# Patient Record
Sex: FEMALE | Race: WHITE | NOT HISPANIC OR LATINO | Employment: UNEMPLOYED | URBAN - METROPOLITAN AREA
[De-identification: names, ages, dates, MRNs, and addresses within clinical notes are randomized per-mention and may not be internally consistent; named-entity substitution may affect disease eponyms.]

---
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
## 2020-02-24 ENCOUNTER — HEALTH MAINTENANCE LETTER (OUTPATIENT)
Age: 20
End: 2020-02-24

## 2020-08-04 ENCOUNTER — APPOINTMENT (OUTPATIENT)
Dept: LAB | Facility: CLINIC | Age: 20
End: 2020-08-04
Attending: NURSE PRACTITIONER
Payer: COMMERCIAL

## 2020-08-04 ENCOUNTER — HOSPITAL ENCOUNTER (OUTPATIENT)
Dept: ULTRASOUND IMAGING | Facility: CLINIC | Age: 20
End: 2020-08-04
Attending: NURSE PRACTITIONER
Payer: COMMERCIAL

## 2020-08-04 DIAGNOSIS — N20.0 KIDNEY STONES: Primary | ICD-10-CM

## 2020-08-04 DIAGNOSIS — R82.994 IDIOPATHIC HYPERCALCIURIA: ICD-10-CM

## 2020-08-04 DIAGNOSIS — N20.0 CALCULUS OF KIDNEY: ICD-10-CM

## 2020-08-04 DIAGNOSIS — E87.6 HYPOKALEMIA: ICD-10-CM

## 2020-08-04 LAB
ALBUMIN SERPL-MCNC: 3.8 G/DL (ref 3.4–5)
ALBUMIN UR-MCNC: NEGATIVE MG/DL
ANION GAP SERPL CALCULATED.3IONS-SCNC: 5 MMOL/L (ref 3–14)
APPEARANCE UR: ABNORMAL
BACTERIA #/AREA URNS HPF: ABNORMAL /HPF
BILIRUB UR QL STRIP: NEGATIVE
BUN SERPL-MCNC: 12 MG/DL (ref 7–30)
CALCIUM SERPL-MCNC: 9.8 MG/DL (ref 8.5–10.1)
CALCIUM UR-MCNC: 14 MG/DL
CALCIUM/CREAT UR: 0.12 G/G CR
CHLORIDE SERPL-SCNC: 100 MMOL/L (ref 94–109)
CO2 SERPL-SCNC: 31 MMOL/L (ref 20–32)
COLOR UR AUTO: YELLOW
CREAT SERPL-MCNC: 0.79 MG/DL (ref 0.52–1.04)
CREAT UR-MCNC: 113 MG/DL
CREAT UR-MCNC: 113 MG/DL
GFR SERPL CREATININE-BSD FRML MDRD: >90 ML/MIN/{1.73_M2}
GLUCOSE SERPL-MCNC: 78 MG/DL (ref 70–99)
GLUCOSE UR STRIP-MCNC: NEGATIVE MG/DL
HGB UR QL STRIP: NEGATIVE
KETONES UR STRIP-MCNC: NEGATIVE MG/DL
LEUKOCYTE ESTERASE UR QL STRIP: NEGATIVE
MUCOUS THREADS #/AREA URNS LPF: PRESENT /LPF
NITRATE UR QL: NEGATIVE
PH UR STRIP: 8 PH (ref 5–7)
PHOSPHATE SERPL-MCNC: 3.3 MG/DL (ref 2.5–4.5)
POTASSIUM SERPL-SCNC: 3.2 MMOL/L (ref 3.4–5.3)
PROT UR-MCNC: 0.15 G/L
PROT/CREAT 24H UR: 0.14 G/G CR (ref 0–0.2)
RBC #/AREA URNS AUTO: 1 /HPF (ref 0–2)
SODIUM SERPL-SCNC: 136 MMOL/L (ref 133–144)
SOURCE: ABNORMAL
SP GR UR STRIP: 1.01 (ref 1–1.03)
SQUAMOUS #/AREA URNS AUTO: 1 /HPF (ref 0–1)
UROBILINOGEN UR STRIP-MCNC: NORMAL MG/DL (ref 0–2)
WBC #/AREA URNS AUTO: <1 /HPF (ref 0–5)

## 2020-08-04 PROCEDURE — 36415 COLL VENOUS BLD VENIPUNCTURE: CPT | Performed by: PEDIATRICS

## 2020-08-04 PROCEDURE — 81001 URINALYSIS AUTO W/SCOPE: CPT | Performed by: PEDIATRICS

## 2020-08-04 PROCEDURE — 84156 ASSAY OF PROTEIN URINE: CPT | Performed by: PEDIATRICS

## 2020-08-04 PROCEDURE — 82340 ASSAY OF CALCIUM IN URINE: CPT | Performed by: PEDIATRICS

## 2020-08-04 PROCEDURE — 80069 RENAL FUNCTION PANEL: CPT | Performed by: PEDIATRICS

## 2020-08-04 PROCEDURE — 76770 US EXAM ABDO BACK WALL COMP: CPT

## 2020-08-17 ENCOUNTER — APPOINTMENT (OUTPATIENT)
Dept: URBAN - METROPOLITAN AREA CLINIC 259 | Age: 20
Setting detail: DERMATOLOGY
End: 2020-08-17

## 2020-08-17 DIAGNOSIS — L57.8 OTHER SKIN CHANGES DUE TO CHRONIC EXPOSURE TO NONIONIZING RADIATION: ICD-10-CM

## 2020-08-17 DIAGNOSIS — D22 MELANOCYTIC NEVI: ICD-10-CM

## 2020-08-17 PROBLEM — D22.5 MELANOCYTIC NEVI OF TRUNK: Status: ACTIVE | Noted: 2020-08-17

## 2020-08-17 PROCEDURE — 99214 OFFICE O/P EST MOD 30 MIN: CPT

## 2020-08-17 PROCEDURE — OTHER COUNSELING: OTHER

## 2020-08-17 ASSESSMENT — LOCATION ZONE DERM: LOCATION ZONE: TRUNK

## 2020-08-17 ASSESSMENT — LOCATION SIMPLE DESCRIPTION DERM
LOCATION SIMPLE: RIGHT UPPER BACK
LOCATION SIMPLE: LEFT UPPER BACK

## 2020-08-17 ASSESSMENT — LOCATION DETAILED DESCRIPTION DERM
LOCATION DETAILED: LEFT SUPERIOR UPPER BACK
LOCATION DETAILED: RIGHT MID-UPPER BACK

## 2020-08-25 ENCOUNTER — VIRTUAL VISIT (OUTPATIENT)
Dept: NEPHROLOGY | Facility: CLINIC | Age: 20
End: 2020-08-25
Attending: PEDIATRICS
Payer: COMMERCIAL

## 2020-08-25 DIAGNOSIS — E87.6 HYPOKALEMIA: ICD-10-CM

## 2020-08-25 DIAGNOSIS — R82.994 HYPERCALCIURIA: ICD-10-CM

## 2020-08-25 DIAGNOSIS — N20.0 CALCULUS OF KIDNEY: ICD-10-CM

## 2020-08-25 RX ORDER — HYDROCHLOROTHIAZIDE 25 MG/1
25 TABLET ORAL
Qty: 180 TABLET | Refills: 11 | Status: SHIPPED | OUTPATIENT
Start: 2020-08-25 | End: 2021-01-08

## 2020-08-25 RX ORDER — POTASSIUM CITRATE 15 MEQ/1
1 TABLET, EXTENDED RELEASE ORAL DAILY
Qty: 90 TABLET | Refills: 11 | Status: SHIPPED | OUTPATIENT
Start: 2020-08-25 | End: 2020-09-22

## 2020-08-25 NOTE — LETTER
8/25/2020      RE: Mariana Quintana  [address]  [facility] [address]       Mariana Quintana is a 20 year old female who is being evaluated via a billable telephone visit.          Return Visit for kidney stones, hypercalciuria    Chief Complaint:  Chief Complaint   Patient presents with     RECHECK       HPI:    I had the pleasure of seeing Mariana Quintana via telephone visit for follow-up of kidney stones, hypercalciuria. Mariana is a 20 year old female on her own today.  Mariana Quintana was last seen in the renal clinic on 8/20/19 by one of my partners, Nata Milligan NP. Since then she has been doing well with no hospitalizations and no surgeries. She has not passed any kidney stones. She has been drinking well and has a Yeti water bottle that she keeps with her. She is taking her hydrochlorothiazide and potassium and not missing doses. She has not had gross hematuria, flank pain. She does not have any side effects from her medications. No dizziness or lightheadedness.     Review of Systems:  No weight loss, dizziness, headaches, abdominal pain, gross hematuria    Allergies:  Mariana has No Known Allergies..    Active Medications:  Current Outpatient Medications   Medication Sig Dispense Refill     citalopram (CELEXA) 20 MG tablet Take 20 mg by mouth daily.       hydrochlorothiazide (HYDRODIURIL) 25 MG tablet Take 1 tablet (25 mg) by mouth 2 times daily 180 tablet 11     potassium citrate 15 MEQ (1620 MG) TBCR Take 1 tablet by mouth daily 90 tablet 11        Immunizations:    There is no immunization history on file for this patient.     PMHx:  Past Medical History:   Diagnosis Date     Idiopathic hypercalciuria     Started therapy with HCTZ in July 2009     Kidney stone 12/2017    Passed stone     Kidney stones Jan 2009    passed left sided stone on 1/28/13         PSHx:    Past Surgical History:   Procedure Laterality Date     LAPAROSCOPIC APPENDECTOMY  12/2017     Patella femoral ligament repair   12/27/2016     PE TUBES  Age 1    chronic otitis media       FHx:  Family History   Problem Relation Age of Onset     Genitourinary Problems Maternal Grandfather         Kidney stones developed in his teens     Kidney Disease Maternal Grandmother         Hypertensive kidney disease       SHx:  Social History     Tobacco Use     Smoking status: Never Smoker   Substance Use Topics     Alcohol use: Not on file     Drug use: Not on file     Social History     Social History Narrative    Mariana graduated from high school and is planning to attend [employer] in the Fall to study sociology, anthropology, and art. She is working as a [email or web address] this Summer.        Physical Exam:    None, video visit    Labs and Imaging:  Results for MARIANA BOWLING (MRN [id number]) as of 8/25/2020 15:37   Ref. Range 8/4/2020 13:40 8/4/2020 13:46   Sodium Latest Ref Range: 133 - 144 mmol/L  136   Potassium Latest Ref Range: 3.4 - 5.3 mmol/L  3.2 (L)   Chloride Latest Ref Range: 94 - 109 mmol/L  100   Carbon Dioxide Latest Ref Range: 20 - 32 mmol/L  31   Urea Nitrogen Latest Ref Range: 7 - 30 mg/dL  12   Creatinine Latest Ref Range: 0.52 - 1.04 mg/dL  0.79   GFR Estimate Latest Ref Range: >60 mL/min/1.73_m2  >90   GFR Estimate If Black Latest Ref Range: >60 mL/min/1.73_m2  >90   Calcium Latest Ref Range: 8.5 - 10.1 mg/dL  9.8   Anion Gap Latest Ref Range: 3 - 14 mmol/L  5   Phosphorus Latest Ref Range: 2.5 - 4.5 mg/dL  3.3   Albumin Latest Ref Range: 3.4 - 5.0 g/dL  3.8   Calcium Urine g/g Cr Latest Units: g/g Cr 0.12    Calcium Urine mg/dL Latest Units: mg/dL 14.0    Creatinine Urine Random Latest Units: mg/dL 113    Creatinine Urine Latest Units: mg/dL 113    Protein Random Urine Latest Units: g/L 0.15    Protein Total Urine g/gr Creatinine Latest Ref Range: 0 - 0.2 g/g Cr 0.14    Glucose Latest Ref Range: 70 - 99 mg/dL  78   Color Urine Unknown Yellow    Appearance Urine Unknown Cloudy    Glucose Urine Latest Ref Range: NEG^Negative mg/dL  Negative    Bilirubin Urine Latest Ref Range: NEG^Negative  Negative    Ketones Urine Latest Ref Range: NEG^Negative mg/dL Negative    Specific Gravity Urine Latest Ref Range: 1.003 - 1.035  1.015    pH Urine Latest Ref Range: 5.0 - 7.0 pH 8.0 (H)    Protein Albumin Urine Latest Ref Range: NEG^Negative mg/dL Negative    Urobilinogen mg/dL Latest Ref Range: 0.0 - 2.0 mg/dL Normal    Nitrite Urine Latest Ref Range: NEG^Negative  Negative    Blood Urine Latest Ref Range: NEG^Negative  Negative    Leukocyte Esterase Urine Latest Ref Range: NEG^Negative  Negative    Source Unknown Midstream Urine    WBC Urine Latest Ref Range: 0 - 5 /HPF <1    RBC Urine Latest Ref Range: 0 - 2 /HPF 1    Bacteria Urine Latest Ref Range: NEG^Negative /HPF Few (A)    Squamous Epithelial /HPF Urine Latest Ref Range: 0 - 1 /HPF 1    Mucous Urine Latest Ref Range: NEG^Negative /LPF Present (A)    EXAM: US RENAL COMPLETE.     HISTORY: Calculus of kidney.     COMPARISON: 8/20/2019     FINDINGS: The right kidney measures 11.8 cm, previously 11.9 cm while  the left kidney measures 12.1 cm, previously 12 cm. There is no  hydronephrosis.  There is no congenital malformation, focal scar, or  mass lesion. There are shadowing echogenic foci in both kidneys. 2  right-sided echogenic foci are present the largest measuring 5 mm. 2  left-sided echogenic foci are present the largest measuring 6.5 mm.  All are located in the interpolar regions of the kidneys.     The bladder is partly filled and unremarkable in appearance.                                                                      IMPRESSION: Multiple nonobstructing renal calculi, the largest  measures 6.5 mm (on the left), slightly increased in size from prior.     I personally reviewed results of laboratory evaluation, imaging studies and past medical records that were available during this outpatient visit.      Assessment and Plan:    Mariana is an 20 year old young woman with a history of kidney  stones due to hypercalciuria who was stone free from 9248-2281 while on HCTZ. This was stopped in May 2014 and a follow up ultrasound was negative for stones in Oct. 2014. Unfortunately, she redeveloped stones in August 2015, 24 hour urine was again elevated at 300mg, and she was restarted on HCTZ. She clinically passed a stone most recently in Dec. 2017. She continues to have 2-3 small stones in each kidney that measure slightly larger this year despite hydrochlorothiazide treatment and high water intake.  I would like to repeat a 24 hour urine (Litholink) collection to assess if her metabolic stone risk factors are optimized on her current therapy. She will be given an order form and instructions how to obtain the collection kit. Urine calcium was suppressed on her spot collection.      Hypokalemia: Potassium was just under the lower range of normal on supplementation. Mariana should be encouraged to eat high potassium foods.       She was encouraged to keep up the good work with water intake. Goal water intake is >2000ml/day.       Patient Education: During this visit I discussed in detail the patient s symptoms, physical exam and evaluation results findings, tentative diagnosis as well as the treatment plan (Including but not limited to possible side effects and complications related to the disease, treatment modalities and intervention(s). Family expressed understanding and consent. Family was receptive and ready to learn; no apparent learning barriers were identified.    Follow up: Return Follow up with Adult nephrology in 1 year. Please return sooner should Mariana become symptomatic.      Telephone start time: 3:40  Telephone end time: 3:47  Total phone time: 7 minutes    Sincerely,    Kim Bernal MD   Pediatric Nephrology    CC:   Patient Care Team:  Doege, Rebecca A, MD as PCP - General (Pediatrics)  Kim Bernal MD as MD (Pediatric Nephrology)  DOEGE, REBECCA A    Copy to patient  TAWNYA  PENNY Alvarado  [address]  [facility] [address]

## 2020-08-25 NOTE — PATIENT INSTRUCTIONS
--------------------------------------------------------------------------------------------------  Please contact our office with any questions or concerns.     Providers book out months in advance please schedule follow up appointments as soon as possible.     [phone]     services: [phone]    On-call Nephrologist for after hours, weekends and urgent concerns: [phone].    Nephrology Office phone number: [phone] (opt.0), Fax #: [phone]    Nephrology Nurses  - Maricel Reed, RN: [phone]  - Cony Martínez RN: [phone]    Collect Litholink (24 hour urine collection). Our nurses will send you the information you need to order.     Adult nephrologists: Dr. Anabella Lee appointments [phone]  Dr. Bertha Mcmullen ([facility]) appointments [phone]

## 2020-08-25 NOTE — PROGRESS NOTES
Return Visit for kidney stones, hypercalciuria    Chief Complaint:  Chief Complaint   Patient presents with     RECHECK       HPI:    I had the pleasure of seeing Mariana Quintana via telephone visit for follow-up of kidney stones, hypercalciuria. Mariana is a 20 year old female on her own today.  Mariana Quintana was last seen in the renal clinic on 8/20/19 by one of my partners, Nata Milligan NP. Since then she has been doing well with no hospitalizations and no surgeries. She has not passed any kidney stones. She has been drinking well and has a Yeti water bottle that she keeps with her. She is taking her hydrochlorothiazide and potassium and not missing doses. She has not had gross hematuria, flank pain. She does not have any side effects from her medications. No dizziness or lightheadedness.     Review of Systems:  No weight loss, dizziness, headaches, abdominal pain, gross hematuria    Allergies:  Mariana has No Known Allergies..    Active Medications:  Current Outpatient Medications   Medication Sig Dispense Refill     citalopram (CELEXA) 20 MG tablet Take 20 mg by mouth daily.       hydrochlorothiazide (HYDRODIURIL) 25 MG tablet Take 1 tablet (25 mg) by mouth 2 times daily 180 tablet 11     potassium citrate 15 MEQ (1620 MG) TBCR Take 1 tablet by mouth daily 90 tablet 11        Immunizations:    There is no immunization history on file for this patient.     PMHx:  Past Medical History:   Diagnosis Date     Idiopathic hypercalciuria     Started therapy with HCTZ in July 2009     Kidney stone 12/2017    Passed stone     Kidney stones Jan 2009    passed left sided stone on 1/28/13         PSHx:    Past Surgical History:   Procedure Laterality Date     LAPAROSCOPIC APPENDECTOMY  12/2017     Patella femoral ligament repair  12/27/2016     PE TUBES  Age 1    chronic otitis media       FHx:  Family History   Problem Relation Age of Onset     Genitourinary Problems Maternal Grandfather         Kidney stones developed in  his teens     Kidney Disease Maternal Grandmother         Hypertensive kidney disease       SHx:  Social History     Tobacco Use     Smoking status: Never Smoker   Substance Use Topics     Alcohol use: Not on file     Drug use: Not on file     Social History     Social History Narrative    Mariana graduated from high school and is planning to attend [employer] in the Fall to study sociology, anthropology, and art. 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        Physical Exam:    None, video visit    Labs and Imaging:  Results for MARIANA BOWLING (MRN [id number]) as of 8/25/2020 15:37   Ref. Range 8/4/2020 13:40 8/4/2020 13:46   Sodium Latest Ref Range: 133 - 144 mmol/L  136   Potassium Latest Ref Range: 3.4 - 5.3 mmol/L  3.2 (L)   Chloride Latest Ref Range: 94 - 109 mmol/L  100   Carbon Dioxide Latest Ref Range: 20 - 32 mmol/L  31   Urea Nitrogen Latest Ref Range: 7 - 30 mg/dL  12   Creatinine Latest Ref Range: 0.52 - 1.04 mg/dL  0.79   GFR Estimate Latest Ref Range: >60 mL/min/1.73_m2  >90   GFR Estimate If Black Latest Ref Range: >60 mL/min/1.73_m2  >90   Calcium Latest Ref Range: 8.5 - 10.1 mg/dL  9.8   Anion Gap Latest Ref Range: 3 - 14 mmol/L  5   Phosphorus Latest Ref Range: 2.5 - 4.5 mg/dL  3.3   Albumin Latest Ref Range: 3.4 - 5.0 g/dL  3.8   Calcium Urine g/g Cr Latest Units: g/g Cr 0.12    Calcium Urine mg/dL Latest Units: mg/dL 14.0    Creatinine Urine Random Latest Units: mg/dL 113    Creatinine Urine Latest Units: mg/dL 113    Protein Random Urine Latest Units: g/L 0.15    Protein Total Urine g/gr Creatinine Latest Ref Range: 0 - 0.2 g/g Cr 0.14    Glucose Latest Ref Range: 70 - 99 mg/dL  78   Color Urine Unknown Yellow    Appearance Urine Unknown Cloudy    Glucose Urine Latest Ref Range: NEG^Negative mg/dL Negative    Bilirubin Urine Latest Ref Range: NEG^Negative  Negative    Ketones Urine Latest Ref Range: NEG^Negative mg/dL Negative    Specific Gravity Urine Latest Ref Range: 1.003 - 1.035  1.015     pH Urine Latest Ref Range: 5.0 - 7.0 pH 8.0 (H)    Protein Albumin Urine Latest Ref Range: NEG^Negative mg/dL Negative    Urobilinogen mg/dL Latest Ref Range: 0.0 - 2.0 mg/dL Normal    Nitrite Urine Latest Ref Range: NEG^Negative  Negative    Blood Urine Latest Ref Range: NEG^Negative  Negative    Leukocyte Esterase Urine Latest Ref Range: NEG^Negative  Negative    Source Unknown Midstream Urine    WBC Urine Latest Ref Range: 0 - 5 /HPF <1    RBC Urine Latest Ref Range: 0 - 2 /HPF 1    Bacteria Urine Latest Ref Range: NEG^Negative /HPF Few (A)    Squamous Epithelial /HPF Urine Latest Ref Range: 0 - 1 /HPF 1    Mucous Urine Latest Ref Range: NEG^Negative /LPF Present (A)    EXAM: US RENAL COMPLETE.     HISTORY: Calculus of kidney.     COMPARISON: 8/20/2019     FINDINGS: The right kidney measures 11.8 cm, previously 11.9 cm while  the left kidney measures 12.1 cm, previously 12 cm. There is no  hydronephrosis.  There is no congenital malformation, focal scar, or  mass lesion. There are shadowing echogenic foci in both kidneys. 2  right-sided echogenic foci are present the largest measuring 5 mm. 2  left-sided echogenic foci are present the largest measuring 6.5 mm.  All are located in the interpolar regions of the kidneys.     The bladder is partly filled and unremarkable in appearance.                                                                      IMPRESSION: Multiple nonobstructing renal calculi, the largest  measures 6.5 mm (on the left), slightly increased in size from prior.     I personally reviewed results of laboratory evaluation, imaging studies and past medical records that were available during this outpatient visit.      Assessment and Plan:    Mariana is an 20 year old young woman with a history of kidney stones due to hypercalciuria who was stone free from 0920-2572 while on HCTZ. This was stopped in May 2014 and a follow up ultrasound was negative for stones in Oct. 2014. Unfortunately, she  redeveloped stones in August 2015, 24 hour urine was again elevated at 300mg, and she was restarted on HCTZ. She clinically passed a stone most recently in Dec. 2017. She continues to have 2-3 small stones in each kidney that measure slightly larger this year despite hydrochlorothiazide treatment and high water intake.  I would like to repeat a 24 hour urine (Litholink) collection to assess if her metabolic stone risk factors are optimized on her current therapy. She will be given an order form and instructions how to obtain the collection kit. Urine calcium was suppressed on her spot collection.      Hypokalemia: Potassium was just under the lower range of normal on supplementation. Mariana should be encouraged to eat high potassium foods.       She was encouraged to keep up the good work with water intake. Goal water intake is >2000ml/day.       Patient Education: During this visit I discussed in detail the patient s symptoms, physical exam and evaluation results findings, tentative diagnosis as well as the treatment plan (Including but not limited to possible side effects and complications related to the disease, treatment modalities and intervention(s). Family expressed understanding and consent. Family was receptive and ready to learn; no apparent learning barriers were identified.    Follow up: Return Follow up with Adult nephrology in 1 year. Please return sooner should Mariana become symptomatic.      Telephone start time: 3:40  Telephone end time: 3:47  Total phone time: 7 minutes    Sincerely,    Kim Bernal MD   Pediatric Nephrology    CC:   Patient Care Team:  Doege, Rebecca A, MD as PCP - General (Pediatrics)  Kim Bernal MD as MD (Pediatric Nephrology)  DOEGE, REBECCA A    Copy to patient  Crystal BOWLINGPENNY  [address]  [facility] [address]

## 2020-08-25 NOTE — PROGRESS NOTES
"Mariana Quintana is a 20 year old female who is being evaluated via a billable telephone visit.      The patient has been notified of following:     \"This telephone visit will be conducted via a call between you and your physician/provider. We have found that certain health care needs can be provided without the need for a physical exam.  This service lets us provide the care you need with a short phone conversation.  If a prescription is necessary we can send it directly to your pharmacy.  If lab work is needed we can place an order for that and you can then stop by our lab to have the test done at a later time.    Telephone visits are billed at different rates depending on your insurance coverage. During this emergency period, for some insurers they may be billed the same as an in-person visit.  Please reach out to your insurance provider with any questions.    If during the course of the call the physician/provider feels a telephone visit is not appropriate, you will not be charged for this service.\"    Patient has given verbal consent for Telephone visit?  Yes    What phone number would you like to be contacted at? [phone]    How would you like to obtain your AVS? MyChart                "

## 2020-08-26 ENCOUNTER — CARE COORDINATION (OUTPATIENT)
Dept: NEPHROLOGY | Facility: CLINIC | Age: 20
End: 2020-08-26

## 2020-08-26 NOTE — PROGRESS NOTES
Faxed order for 24 hour urine collection to [facility]. Family is aware kit will be mailed out to the home.

## 2020-09-03 ENCOUNTER — TRANSFERRED RECORDS (OUTPATIENT)
Dept: HEALTH INFORMATION MANAGEMENT | Facility: CLINIC | Age: 20
End: 2020-09-03

## 2020-09-22 ENCOUNTER — TELEPHONE (OUTPATIENT)
Dept: NEPHROLOGY | Facility: CLINIC | Age: 20
End: 2020-09-22

## 2020-09-22 DIAGNOSIS — E87.6 HYPOKALEMIA: ICD-10-CM

## 2020-09-22 DIAGNOSIS — N20.0 KIDNEY STONES: Primary | ICD-10-CM

## 2020-09-22 RX ORDER — POTASSIUM CHLORIDE 750 MG/1
20 CAPSULE, EXTENDED RELEASE ORAL DAILY
Qty: 60 CAPSULE | Refills: 11 | Status: SHIPPED | OUTPATIENT
Start: 2020-09-22 | End: 2020-10-05

## 2020-09-22 NOTE — TELEPHONE ENCOUNTER
Left a message with Mariana and her Mom to give her the Litholink results with the potassium medication change. Gave direct nurse callback number if they have any questions.     ----- Message from Kim Bernal MD sent at 9/22/2020 10:22 AM CDT -----  Regarding: RE: Litholink Results  Thanks. Those aren't getting flagged to me when they are entered.     You can tell them that the urine volume was good at 2.2 liters. Her sodium was low and calcium was normal on her thiazide treatment. The only thing that may increase the risk of stones was that her urine pH was high. This can make it more likely to form calcium phosphate stones. The potassium citrate medication that she is on increases urine pH, so I'd like to stop that and start just potassium chloride as her potassium supplement. I sent this prescription to her mail order pharmacy. Overall, the report looked really good.     Kim  ----- Message -----  From: Cony Martínez RN  Sent: 9/22/2020   9:09 AM CDT  To: Kim Bernal MD, #  Subject: Litholink Results                                Hi Kim,     Mom called wondering if you reviewed Mariana's Litholink results? Looks like there is some results scanned into Epic.    Cony

## 2020-10-05 DIAGNOSIS — E87.6 HYPOKALEMIA: ICD-10-CM

## 2020-10-05 RX ORDER — POTASSIUM CHLORIDE 750 MG/1
20 CAPSULE, EXTENDED RELEASE ORAL DAILY
Qty: 180 CAPSULE | Refills: 3 | Status: SHIPPED | OUTPATIENT
Start: 2020-10-05 | End: 2021-01-08

## 2020-10-21 ENCOUNTER — TELEPHONE (OUTPATIENT)
Dept: NEPHROLOGY | Facility: CLINIC | Age: 20
End: 2020-10-21

## 2020-10-21 NOTE — TELEPHONE ENCOUNTER
Clarified with pharmacy that potassium citrate should be discontinued. Patient is currently on potassium chloride 20 mEq daily.

## 2020-10-21 NOTE — TELEPHONE ENCOUNTER
Health Call Center    Phone Message    May a detailed message be left on voicemail: yes     Reason for Call: Medication Question or concern regarding medication   Prescription Clarification  Name of Medication: potassium  Prescribing Provider: Priyank   Pharmacy: [facility] on file   What on the order needs clarification? rec'd rx from Dr Bernal for KCl 10 mEq capsules; pt had potassium citrate rx from MONIE Milligan filled in August. Questioning duplication of therapy. Please clarify. Ref# [id number]. Thanks.          Action Taken: Message routed to:  Other: [place] [facility] [place]: Not Applicable

## 2020-12-13 ENCOUNTER — HEALTH MAINTENANCE LETTER (OUTPATIENT)
Age: 20
End: 2020-12-13

## 2021-01-08 ENCOUNTER — TELEPHONE (OUTPATIENT)
Dept: NEPHROLOGY | Facility: CLINIC | Age: 21
End: 2021-01-08

## 2021-01-08 DIAGNOSIS — R82.994 HYPERCALCIURIA: ICD-10-CM

## 2021-01-08 DIAGNOSIS — E87.6 HYPOKALEMIA: ICD-10-CM

## 2021-01-08 RX ORDER — HYDROCHLOROTHIAZIDE 25 MG/1
25 TABLET ORAL
Qty: 180 TABLET | Refills: 11 | Status: SHIPPED | OUTPATIENT
Start: 2021-01-08 | End: 2022-04-20

## 2021-01-08 RX ORDER — POTASSIUM CHLORIDE 750 MG/1
20 CAPSULE, EXTENDED RELEASE ORAL DAILY
Qty: 180 CAPSULE | Refills: 3 | Status: SHIPPED | OUTPATIENT
Start: 2021-01-08 | End: 2021-03-19

## 2021-01-08 NOTE — TELEPHONE ENCOUNTER
[facility] Call Center    Phone Message    May a detailed message be left on voicemail: yes     Reason for Call: Medication Question or concern regarding medication   Prescription Clarification  Name of Medication: hydrochlorothiazide (HYDRODIURIL) 25 MG tablet, potassium chloride ER (MICRO-K) 10 MEQ CR capsule  Prescribing Provider: Dr Bernal   Pharmacy: [facility] (P) [phone] (F) [phone]   What on the order needs clarification? Pt's mom called and stated they are switching to to the [facility] for the medications listed above. A new Rx for each med needs to be sent to [facility], per their request.          Action Taken: Message routed to:  Other: Ped's Neph    Travel Screening: Not Applicable

## 2021-03-19 ENCOUNTER — TELEPHONE (OUTPATIENT)
Dept: NEPHROLOGY | Facility: CLINIC | Age: 21
End: 2021-03-19

## 2021-03-19 DIAGNOSIS — E87.6 HYPOKALEMIA: ICD-10-CM

## 2021-03-19 RX ORDER — POTASSIUM CHLORIDE 750 MG/1
CAPSULE, EXTENDED RELEASE ORAL
Qty: 270 CAPSULE | Refills: 3 | Status: SHIPPED | OUTPATIENT
Start: 2021-03-19 | End: 2021-04-14

## 2021-03-19 NOTE — TELEPHONE ENCOUNTER
[facility] Call Center    Phone Message    May a detailed message be left on voicemail: yes     Reason for Call: Symptoms or Concerns     Shailesh Rojas would like to discuss with Dr. Bernal care team regarding patient's low potassium of 2.9 today. Patient has been dealing with recent kidney stone issues, having a stent placed and kidney stone laser procedure today. During her recent ER trips and hospital visit, patient had blood test that did indicate that potassium was low and it has been for a few weeks. Mom would like to know if patient should take more of the potassium chloride 10 MEQ CR capsule that Dr. Bernal prescribed, patient is currently only taking 2 capsule a day. Please call mom back regarding request, [phone].

## 2021-03-19 NOTE — TELEPHONE ENCOUNTER
Spoke with Dr. Bernal who increase potassium to 2 tabs in the morning and 1 tab at night. She recommended lab should be repeated in two weeks. Mariana will make an appointment at [facility] to have her lab level rechecked after medication increase.

## 2021-03-29 ENCOUNTER — MEDICAL CORRESPONDENCE (OUTPATIENT)
Dept: HEALTH INFORMATION MANAGEMENT | Facility: CLINIC | Age: 21
End: 2021-03-29

## 2021-03-29 DIAGNOSIS — N20.0 KIDNEY STONES: Primary | ICD-10-CM

## 2021-04-05 DIAGNOSIS — E87.6 HYPOKALEMIA: ICD-10-CM

## 2021-04-05 LAB — POTASSIUM SERPL-SCNC: 3.3 MMOL/L (ref 3.4–5.3)

## 2021-04-05 PROCEDURE — 84132 ASSAY OF SERUM POTASSIUM: CPT | Performed by: PEDIATRICS

## 2021-04-05 PROCEDURE — 36415 COLL VENOUS BLD VENIPUNCTURE: CPT | Performed by: PEDIATRICS

## 2021-04-13 DIAGNOSIS — N20.0 KIDNEY STONES: Primary | ICD-10-CM

## 2021-04-13 LAB
ANION GAP SERPL CALCULATED.3IONS-SCNC: 6 MMOL/L (ref 3–14)
BUN SERPL-MCNC: 13 MG/DL (ref 7–30)
CALCIUM SERPL-MCNC: 9.1 MG/DL (ref 8.5–10.1)
CHLORIDE SERPL-SCNC: 99 MMOL/L (ref 94–109)
CO2 SERPL-SCNC: 28 MMOL/L (ref 20–32)
POTASSIUM SERPL-SCNC: 2.8 MMOL/L (ref 3.4–5.3)
PTH-INTACT SERPL-MCNC: 27 PG/ML (ref 18–80)
SODIUM SERPL-SCNC: 133 MMOL/L (ref 133–144)
URATE SERPL-MCNC: 6 MG/DL (ref 2.6–6)

## 2021-04-13 PROCEDURE — 84520 ASSAY OF UREA NITROGEN: CPT | Performed by: UROLOGY

## 2021-04-13 PROCEDURE — 80051 ELECTROLYTE PANEL: CPT | Performed by: UROLOGY

## 2021-04-13 PROCEDURE — 36415 COLL VENOUS BLD VENIPUNCTURE: CPT | Performed by: UROLOGY

## 2021-04-13 PROCEDURE — 82310 ASSAY OF CALCIUM: CPT | Performed by: UROLOGY

## 2021-04-13 PROCEDURE — 83970 ASSAY OF PARATHORMONE: CPT | Performed by: UROLOGY

## 2021-04-13 PROCEDURE — 84550 ASSAY OF BLOOD/URIC ACID: CPT | Performed by: UROLOGY

## 2021-04-14 ENCOUNTER — TELEPHONE (OUTPATIENT)
Dept: NEPHROLOGY | Facility: CLINIC | Age: 21
End: 2021-04-14

## 2021-04-14 DIAGNOSIS — E87.6 HYPOKALEMIA: ICD-10-CM

## 2021-04-14 RX ORDER — POTASSIUM CHLORIDE 750 MG/1
20 CAPSULE, EXTENDED RELEASE ORAL 2 TIMES DAILY
Qty: 360 CAPSULE | Refills: 3 | Status: SHIPPED | OUTPATIENT
Start: 2021-04-14 | End: 2021-06-03

## 2021-04-14 NOTE — TELEPHONE ENCOUNTER
Spoke with Mom and had her increase Mariana's potassium chloride. Repeat labs will be checked in one month.     Mom also said Mariana completed a Litholink collection per request of her urologist. Results she be available in about 1-2 weeks.    ----- Message from Kim Bernal MD sent at 4/14/2021  2:12 PM CDT -----    Please have her increase potassium to 20mEq BID (looks like currently on 20/10). Thank you.     Kim  ----- Message -----  From: Cony Martínez RN  Sent: 4/14/2021   1:54 PM CDT  To: Kim Bernal MD, #  Subject: Potassium Levels                                 aMriana had labs completed yesterday at our [place] clinic per request of Mariana's urologist. Her potassium came back at 2.8.Mom also told me she just completed a Litholink for urology and results should be back in about a week.     Mom also asked if you know any of the adult providers at [place]? And if they would be good for Mariana to see? If not, they will stick with you your recommended before hand.

## 2021-04-15 ENCOUNTER — TELEPHONE (OUTPATIENT)
Dept: NEPHROLOGY | Facility: CLINIC | Age: 21
End: 2021-04-15

## 2021-04-15 DIAGNOSIS — N20.0 RECURRENT KIDNEY STONES: Primary | ICD-10-CM

## 2021-04-15 NOTE — TELEPHONE ENCOUNTER
[facility] Call Center    Phone Message    May a detailed message be left on voicemail: yes     Reason for Call: Other: Pt calling because she is transitioning from peds nephrology to , and in the past she has done an ultrasound and labs prior to every appt and she is wondering if  will want those done too. Please call back to discuss.     Action Taken: Message routed to:  [facility] uro    Travel Screening: Not Applicable

## 2021-04-16 NOTE — TELEPHONE ENCOUNTER
John, renal ultrasound, renal panel, magnesium level dx recurrent kidney stones.  Anabella Lee MD

## 2021-04-17 ENCOUNTER — HEALTH MAINTENANCE LETTER (OUTPATIENT)
Age: 21
End: 2021-04-17

## 2021-04-19 ENCOUNTER — MEDICAL CORRESPONDENCE (OUTPATIENT)
Dept: HEALTH INFORMATION MANAGEMENT | Facility: CLINIC | Age: 21
End: 2021-04-19

## 2021-04-19 NOTE — TELEPHONE ENCOUNTER
[employer] order form filled out and faxed to [facility] at [phone].    Azucena Palmer LPN  04/19/21  9:28 AM

## 2021-04-27 ENCOUNTER — TELEPHONE (OUTPATIENT)
Dept: NEPHROLOGY | Facility: CLINIC | Age: 21
End: 2021-04-27

## 2021-04-27 NOTE — TELEPHONE ENCOUNTER
[facility] Call Center    Phone Message    May a detailed message be left on voicemail: yes     Reason for Call: Other: pt did a 24 hr litholink test with [facility] 2 weeks ago, wondering if she should still do the one she got in the mail from our clinic? please call susannah, thank you     Action Taken: Message routed to:  [facility] uro    Travel Screening: Not Applicable

## 2021-04-28 NOTE — TELEPHONE ENCOUNTER
Contacted patient and informed her that she does not need to repeat the Litholink.  Azucena Palmer LPN  Urology Clinic Interim Supervisor  [facility]

## 2021-05-27 ENCOUNTER — TELEPHONE (OUTPATIENT)
Dept: NEPHROLOGY | Facility: CLINIC | Age: 21
End: 2021-05-27

## 2021-05-27 DIAGNOSIS — E87.6 HYPOKALEMIA: ICD-10-CM

## 2021-05-27 LAB
ALBUMIN SERPL-MCNC: 3.6 G/DL (ref 3.4–5)
ANION GAP SERPL CALCULATED.3IONS-SCNC: 7 MMOL/L (ref 3–14)
BUN SERPL-MCNC: 14 MG/DL (ref 7–30)
CALCIUM SERPL-MCNC: 9.5 MG/DL (ref 8.5–10.1)
CHLORIDE SERPL-SCNC: 98 MMOL/L (ref 94–109)
CO2 SERPL-SCNC: 29 MMOL/L (ref 20–32)
CREAT SERPL-MCNC: 0.77 MG/DL (ref 0.52–1.04)
GFR SERPL CREATININE-BSD FRML MDRD: >90 ML/MIN/{1.73_M2}
GLUCOSE SERPL-MCNC: 99 MG/DL (ref 70–99)
MAGNESIUM SERPL-MCNC: 1.9 MG/DL (ref 1.6–2.3)
PHOSPHATE SERPL-MCNC: 2.7 MG/DL (ref 2.5–4.5)
POTASSIUM SERPL-SCNC: 2.7 MMOL/L (ref 3.4–5.3)
SODIUM SERPL-SCNC: 134 MMOL/L (ref 133–144)

## 2021-05-27 PROCEDURE — 36415 COLL VENOUS BLD VENIPUNCTURE: CPT | Performed by: INTERNAL MEDICINE

## 2021-05-27 PROCEDURE — 80069 RENAL FUNCTION PANEL: CPT | Performed by: INTERNAL MEDICINE

## 2021-05-27 PROCEDURE — 83735 ASSAY OF MAGNESIUM: CPT | Performed by: INTERNAL MEDICINE

## 2021-05-27 NOTE — TELEPHONE ENCOUNTER
[facility] Call Center    Phone Message    May a detailed message be left on voicemail: yes     Reason for Call: Other: Patient is requesting a call back to discuss the next steps for care. Please call patient at [phone] to advise.     Action Taken: Message routed to:  [facility] ([facility]): ELDA Bahena    Travel Screening: Not Applicable

## 2021-06-02 ENCOUNTER — TELEPHONE (OUTPATIENT)
Dept: NEPHROLOGY | Facility: CLINIC | Age: 21
End: 2021-06-02

## 2021-06-02 NOTE — TELEPHONE ENCOUNTER
Left message with Mom letting her know Mariana's potassium level and the plan to have her potassium supplement changed to yo 2 capsules twice a day. Gave Mom nurse call back number if she has any questions.     ----- Message from Kim Bernal MD sent at 6/2/2021 12:27 PM CDT -----  Marion,  Mariana's potassium was 2.7. Can you tell her to increase her KCl to 20meq BID (currently on 20/10 per the notes). Will recheck when she sees Dr. Nichole in adult nephrology scheduled in July.     Kim

## 2021-06-03 DIAGNOSIS — E87.6 HYPOKALEMIA: ICD-10-CM

## 2021-06-03 RX ORDER — POTASSIUM CHLORIDE 750 MG/1
CAPSULE, EXTENDED RELEASE ORAL
Qty: 450 CAPSULE | Refills: 1 | Status: SHIPPED | OUTPATIENT
Start: 2021-06-03 | End: 2021-07-29

## 2021-06-03 NOTE — TELEPHONE ENCOUNTER
Mom called back and said that Mariana is already on 20mEq of potassium twice a day. Increase was made in April but med list was not updated. Dr. Bernal said then to increase potassium to 30mEq in the morning and 20mEq at night.

## 2021-06-04 NOTE — TELEPHONE ENCOUNTER
Dr. Bernal addressed this call. Patient has a follow up appointment with . no additional call is needed at this time.

## 2021-07-21 ENCOUNTER — TELEPHONE (OUTPATIENT)
Dept: NEPHROLOGY | Facility: CLINIC | Age: 21
End: 2021-07-21

## 2021-07-21 NOTE — TELEPHONE ENCOUNTER
[facility] Call Center    Phone Message    May a detailed message be left on voicemail: yes     Reason for Call: Other: Pt just transfered over to the adult side but can't get in to December. Pt's mom states they can't wait that long and was wonder if either Dr Bernal could see the Pt one last time or could recommened someone else. Is currently scheduled with Dr Lee           Action Taken: Other: Ped's Neph    Travel Screening: Not Applicable

## 2021-07-21 NOTE — TELEPHONE ENCOUNTER
I spoke with patient and told her her I will reach out to supervisor about getting appointment possibly sooner in the nephrology department Martha Fraser, GARY Staff Nurse

## 2021-07-23 ENCOUNTER — TELEPHONE (OUTPATIENT)
Dept: NEPHROLOGY | Facility: CLINIC | Age: 21
End: 2021-07-23

## 2021-07-23 NOTE — TELEPHONE ENCOUNTER
[facility] Call Center    Phone Message    May a detailed message be left on voicemail: yes     Reason for Call: Follow up on 07/21 encounter    Mom Nicole was calling to follow up from 07/21 encounter,   Other: Pt just transfered over to the adult side but can't get in to December. Pt's mom states they can't wait that long and was wonder if either Dr Bernal could see the Pt one last time or could recommened someone else. Is currently scheduled with Dr Lee    Please call mom back with update status on that request when available, Nicole Mariano [phone].

## 2021-07-28 ENCOUNTER — ANCILLARY PROCEDURE (OUTPATIENT)
Dept: ULTRASOUND IMAGING | Facility: CLINIC | Age: 21
End: 2021-07-28
Attending: INTERNAL MEDICINE
Payer: COMMERCIAL

## 2021-07-28 ENCOUNTER — LAB (OUTPATIENT)
Dept: LAB | Facility: CLINIC | Age: 21
End: 2021-07-28
Payer: COMMERCIAL

## 2021-07-28 DIAGNOSIS — N20.0 RECURRENT KIDNEY STONES: ICD-10-CM

## 2021-07-28 LAB
ALBUMIN SERPL-MCNC: 3.3 G/DL (ref 3.4–5)
ANION GAP SERPL CALCULATED.3IONS-SCNC: 4 MMOL/L (ref 3–14)
BUN SERPL-MCNC: 8 MG/DL (ref 7–30)
CALCIUM SERPL-MCNC: 8.9 MG/DL (ref 8.5–10.1)
CHLORIDE BLD-SCNC: 107 MMOL/L (ref 94–109)
CO2 SERPL-SCNC: 30 MMOL/L (ref 20–32)
CREAT SERPL-MCNC: 0.8 MG/DL (ref 0.52–1.04)
GFR SERPL CREATININE-BSD FRML MDRD: >90 ML/MIN/1.73M2
GLUCOSE BLD-MCNC: 96 MG/DL (ref 70–99)
MAGNESIUM SERPL-MCNC: 2.2 MG/DL (ref 1.6–2.3)
PHOSPHATE SERPL-MCNC: 3.1 MG/DL (ref 2.5–4.5)
POTASSIUM BLD-SCNC: 4.2 MMOL/L (ref 3.4–5.3)
SODIUM SERPL-SCNC: 141 MMOL/L (ref 133–144)

## 2021-07-28 PROCEDURE — 76770 US EXAM ABDO BACK WALL COMP: CPT | Performed by: STUDENT IN AN ORGANIZED HEALTH CARE EDUCATION/TRAINING PROGRAM

## 2021-07-28 PROCEDURE — 80069 RENAL FUNCTION PANEL: CPT

## 2021-07-28 PROCEDURE — 83735 ASSAY OF MAGNESIUM: CPT

## 2021-07-28 PROCEDURE — 36415 COLL VENOUS BLD VENIPUNCTURE: CPT

## 2021-07-29 ENCOUNTER — VIRTUAL VISIT (OUTPATIENT)
Dept: NEPHROLOGY | Facility: CLINIC | Age: 21
End: 2021-07-29
Payer: COMMERCIAL

## 2021-07-29 VITALS — WEIGHT: 175 LBS | BODY MASS INDEX: 27.66 KG/M2

## 2021-07-29 DIAGNOSIS — R82.994 IDIOPATHIC HYPERCALCIURIA: ICD-10-CM

## 2021-07-29 DIAGNOSIS — E87.6 HYPOKALEMIA: ICD-10-CM

## 2021-07-29 DIAGNOSIS — N20.0 KIDNEY STONES: Primary | ICD-10-CM

## 2021-07-29 PROCEDURE — 99214 OFFICE O/P EST MOD 30 MIN: CPT | Mod: GT | Performed by: PEDIATRICS

## 2021-07-29 RX ORDER — POTASSIUM CHLORIDE 750 MG/1
CAPSULE, EXTENDED RELEASE ORAL
Qty: 450 CAPSULE | Refills: 1 | Status: SHIPPED | OUTPATIENT
Start: 2021-07-29 | End: 2022-04-20

## 2021-07-29 RX ORDER — NORGESTIMATE AND ETHINYL ESTRADIOL 0.25-0.035
1 KIT ORAL DAILY
COMMUNITY

## 2021-07-29 NOTE — PROGRESS NOTES
Mariana is a 21 year old who is being evaluated via a billable video visit.      How would you like to obtain your AVS? MyChart  If the video visit is dropped, the invitation should be resent by: Nancy  Will anyone else be joining your video visit? No       [place] is in [place] for visit.    Video-Visit Details    Type of service:  Video Visit      Originating Site Participant: Dr. Kim Bernal  Originating Site Location: clinic  Distant Site: Participant: Mariana  Distant Site Location: Patient's home  Start time: 3:25  End time: 3:37    I certify that this visit was done via secure two-way simultaneous audio and video transmission (legalPAD) with informed consent of the patient and/or guardian. Over 50% of the time was counseling or coordinating care.

## 2021-07-29 NOTE — TELEPHONE ENCOUNTER
Called and spoke with mom about concerns that Mariana is having regarding kidney stones and potassium/ renal functions.  GOLD and labs completed @  [facility] 7/28/21. Pt added to Dr. Bernal scheduled 7/29/21 @ 3.10pm to address concerns. Dr. Bernal updated via text message.    Yasmin KERR  Pediatric Nephrology  Patient Coordinator/ Complex Referral Specialist  [facility] .

## 2021-07-29 NOTE — LETTER
7/29/2021      RE: Mariana Quintana  [address]  [facility] [address]       Return Visit for kidney stones, hypercalciuria, hypokalemia    Chief Complaint:  Chief Complaint   Patient presents with     RECHECK     Follow-up on Kidney Stones.       HPI:    I had the pleasure of seeing Mariana Quintana via video visit today for follow-up of kidney stones, hypercalciuria, hypokalemia. Mariana is a 21 year old female on her own in clinic today.  Mariana Quintana was last seen in the renal clinic on 8/25/20. At that visit, she was referred to adult nephrology, however due to scheduling issues she made one final appointment with me before transition. Records in Epic were reviewed:   9/2020: Litholink evaluation, volume 2.22L, urine calcium normal, urine pH high, K citrate was stopped  3/2/21: ER visit for flank pain. Treated with flomax and discharged. CT scan Right hydronephrosis with 4mm ureteral stone, left kidney with scattered 2-3mm nonobstructive stones  3/3/21: Returned to ER and admitted for 4 days. Had right retrograde and stent on 3/3.   3/19/21: R ureteroscopy and laser lithotrypsy. Stone analysis: 50% calcium phosphate, 30% calcium oxalate monohydrate, 20% calcium oxalate dihydrate  5/14/21: Follow up visit with Dr. Oviedo    She has been drinking water well. She adds lemon to her water every other day. She takes 10-14 cups per day. She is taking her hydrochlorothiazide and potassium supplements with no side effects. She has also started a cranberry supplement at the recommendation of her urologist. 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      Review of external notes as documented above   Review of the result(s) of each unique test - see above and below    Active Medications:  Current Outpatient Medications   Medication Sig Dispense Refill     citalopram (CELEXA) 20 MG tablet Take 20 mg by mouth daily.       CRANBERRY PO Take 1 tablet by mouth daily        hydrochlorothiazide (HYDRODIURIL) 25 MG tablet Take 1 tablet (25 mg) by mouth 2 times daily 180 tablet 11     norgestimate-ethinyl estradiol (ESTARYLLA) 0.25-35 MG-MCG tablet Take 1 tablet by mouth daily       potassium chloride ER (MICRO-K) 10 MEQ CR capsule Take 30mEq (3 tabs) in the morning and 20mEq (2 tabs) at night. 450 capsule 1     VITAMIN D PO Take 1 tablet by mouth daily          Physical Exam:    Wt 79.4 kg (175 lb)   BMI 27.66 kg/m    Exam:  General: No apparent distress. Awake, alert, well-appearing.   HEENT:  Normocephalic and atraumatic. Mucous membranes are moist. No periorbital edema. Facial muscles move symmetrically.   Neck: Neck is symmetrical with trachea midline.   Eyes: Conjunctiva and eyelids normal bilaterally.    Respiratory: breathing unlabored, no tachypnea.   Cardiovascular: No edema, no pallor, no cyanosis.  Abdomen: Non-distended.  Skin: No concerning rash or lesions observed on exposed skin.   Extremities: Wide range of motion observed. No peripheral edema.   Neuro: Mood and behavior appropriate for age.   Musculoskeletal: Symmetric and appropriate movements of extremities.    Labs and Imaging:  Results for LEFTY BOWLING (MRN [id number]) as of 8/17/2021 12:52   Ref. Range 7/28/2021 07:57   Sodium Latest Ref Range: 133 - 144 mmol/L 141   Potassium Latest Ref Range: 3.4 - 5.3 mmol/L 4.2   Chloride Latest Ref Range: 94 - 109 mmol/L 107   Carbon Dioxide Latest Ref Range: 20 - 32 mmol/L 30   Urea Nitrogen Latest Ref Range: 7 - 30 mg/dL 8   Creatinine Latest Ref Range: 0.52 - 1.04 mg/dL 0.80   GFR Estimate Latest Ref Range: >60 mL/min/1.73m2 >90   Calcium Latest Ref Range: 8.5 - 10.1 mg/dL 8.9   Anion Gap Latest Ref Range: 3 - 14 mmol/L 4   Magnesium Latest Ref Range: 1.6 - 2.3 mg/dL 2.2   Phosphorus Latest Ref Range: 2.5 - 4.5 mg/dL 3.1   Albumin Latest Ref Range: 3.4 - 5.0 g/dL 3.3 (L)   Glucose Latest Ref Range: 70 - 99 mg/dL 96     EXAM: US RENAL COMPLETE.     HISTORY: Recurrent  kidney stones.     COMPARISON: 8/4/2020     FINDINGS: The right kidney measures 11.8 cm and the left kidney  measures 12.1 cm.      There is no hydronephrosis.  Normal cortical thickness and  echogenicity.  No focal scar or abnormal mass.      There are shadowing echogenic foci two on the right and three on the  left. 2 right-sided echogenic foci present in the mid kidney and  measures up to 3-4 mm. 3 left-sided echogenic foci, mid to inferior  kidney, are present ranging from 3 - 7 mm in size.       The bladder is partially distended.                                                                      IMPRESSION: Multiple bilateral nonobstructing renal calculi.  Largest  on the right measures up to 4 mm and on the left up to 7 mm.       30 minutes spent on the date of the encounter doing chart review, history and exam, documentation and further activities per the note    Assessment and Plan:      ICD-10-CM    1. Kidney stones  N20.0    2. Hypokalemia  E87.6 potassium chloride ER (MICRO-K) 10 MEQ CR capsule   3. Idiopathic hypercalciuria  R82.994        Mariana is a 21 year old young woman with a history of kidney stones due to hypercalciuria who was stone free from 4806-0518 while on HCTZ. This was stopped in May 2014 and a follow up ultrasound was negative for stones in Oct. 2014. Unfortunately, she redeveloped stones in August 2015, 24 hour urine calcium was again elevated at 300mg, and she was restarted on HCTZ. She passed a stone in 2017 and required lithotripsy in March 2021 for calcium phosphate/calcium oxalate stone. Her ultrasound today shows stones in both kidneys, the largest measuring 7mm. She should continue her hydrochlorothiazide. Calcium was normal (155mg/24 hours) on Litholink collection in Sept 2020 on her current dose. Creatinine is normal and she is not having any side effects of the hydrochlorothiazide except for hypokalemia.      Hypokalemia: Potassium is now normal after increasing potassium  supplementation .       She was encouraged to keep up the good work with water intake. Goal water intake is >2000ml/day and ideally 3L/day. I will defer repeat of Litholink collection to her adult nephrologist.        Patient Education: During this visit I discussed in detail the patient s symptoms, physical exam and evaluation results findings, tentative diagnosis as well as the treatment plan (Including but not limited to possible side effects and complications related to the disease, treatment modalities and intervention(s). Family expressed understanding and consent. Family was receptive and ready to learn; no apparent learning barriers were identified.    Follow up: No follow-ups on file. Follow up with Dr. Lee on 12/7/21 as previously scheduled. Please return sooner should Mariana become symptomatic.      Sincerely,    Kim Bernal MD   Pediatric Nephrology    CC:   Patient Care Team:  Doege, Rebecca A, MD as PCP - General (Pediatrics)  Kim Bernal MD as Assigned Pediatric Specialist Provider  Kim Bernal MD as Referring Physician (Pediatric Nephrology)  Anabella Lee MD as MD (Nephrology)  DOEGE, REBECCA A    Copy to patient  Crystal [place]OW[place]  [address]  [facility] [address]      Mariana is a 21 year old who is being evaluated via a billable video visit.      How would you like to obtain your AVS? MyChart  If the video visit is dropped, the invitation should be resent by: MyChart  Will anyone else be joining your video visit? No       [place] in [place] for [place].    Video-Visit Details    Type of service:  Video Visit      Originating Site Participant: Dr. Kim Bernal  Originating Site Location: clinic  Distant Site: Participant: Mariana  Distant Site Location: Patient's home  Start time: 3:25  End time: 3:37    I certify that this visit was done via secure two-way simultaneous audio and video transmission ([employer]) with informed consent of  the patient and/or guardian. Over 50% of the time was counseling or coordinating care.          Kim Bernal MD

## 2021-07-29 NOTE — PROGRESS NOTES
Return Visit for kidney stones, hypercalciuria, hypokalemia    Chief Complaint:  Chief Complaint   Patient presents with     RECHECK     Follow-up on Kidney Stones.       HPI:    I had the pleasure of seeing Mariana Quintana via video visit today for follow-up of kidney stones, hypercalciuria, hypokalemia. Mariana is a 21 year old female on her own in clinic today.  Mariana Quintana was last seen in the renal clinic on 8/25/20. At that visit, she was referred to adult nephrology, however due to scheduling issues she made one final appointment with me before transition. Records in Epic were reviewed:   9/2020: Litholink evaluation, volume 2.22L, urine calcium normal, urine pH high, K citrate was stopped  3/2/21: ER visit for flank pain. Treated with flomax and discharged. CT scan Right hydronephrosis with 4mm ureteral stone, left kidney with scattered 2-3mm nonobstructive stones  3/3/21: Returned to ER and admitted for 4 days. Had right retrograde and stent on 3/3.   3/19/21: R ureteroscopy and laser lithotrypsy. Stone analysis: 50% calcium phosphate, 30% calcium oxalate monohydrate, 20% calcium oxalate dihydrate  5/14/21: Follow up visit with Dr. Oviedo    She has been drinking water well. She adds lemon to her water every other day. She takes 10-14 cups per day. She is taking her hydrochlorothiazide and potassium supplements with no side effects. She has also started a cranberry supplement at the recommendation of her urologist. 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      Review of external notes as documented above   Review of the result(s) of each unique test - see above and below    Active Medications:  Current Outpatient Medications   Medication Sig Dispense Refill     citalopram (CELEXA) 20 MG tablet Take 20 mg by mouth daily.       CRANBERRY PO Take 1 tablet by mouth daily       hydrochlorothiazide (HYDRODIURIL) 25 MG tablet Take 1 tablet (25 mg) by mouth 2 times  daily 180 tablet 11     norgestimate-ethinyl estradiol (ESTARYLLA) 0.25-35 MG-MCG tablet Take 1 tablet by mouth daily       potassium chloride ER (MICRO-K) 10 MEQ CR capsule Take 30mEq (3 tabs) in the morning and 20mEq (2 tabs) at night. 450 capsule 1     VITAMIN D PO Take 1 tablet by mouth daily          Physical Exam:    Wt 79.4 kg (175 lb)   BMI 27.66 kg/m    Exam:  General: No apparent distress. Awake, alert, well-appearing.   HEENT:  Normocephalic and atraumatic. Mucous membranes are moist. No periorbital edema. Facial muscles move symmetrically.   Neck: Neck is symmetrical with trachea midline.   Eyes: Conjunctiva and eyelids normal bilaterally.    Respiratory: breathing unlabored, no tachypnea.   Cardiovascular: No edema, no pallor, no cyanosis.  Abdomen: Non-distended.  Skin: No concerning rash or lesions observed on exposed skin.   Extremities: Wide range of motion observed. No peripheral edema.   Neuro: Mood and behavior appropriate for age.   Musculoskeletal: Symmetric and appropriate movements of extremities.    Labs and Imaging:  Results for LEFTY BOWLING (MRN [id number]) as of 8/17/2021 12:52   Ref. Range 7/28/2021 07:57   Sodium Latest Ref Range: 133 - 144 mmol/L 141   Potassium Latest Ref Range: 3.4 - 5.3 mmol/L 4.2   Chloride Latest Ref Range: 94 - 109 mmol/L 107   Carbon Dioxide Latest Ref Range: 20 - 32 mmol/L 30   Urea Nitrogen Latest Ref Range: 7 - 30 mg/dL 8   Creatinine Latest Ref Range: 0.52 - 1.04 mg/dL 0.80   GFR Estimate Latest Ref Range: >60 mL/min/1.73m2 >90   Calcium Latest Ref Range: 8.5 - 10.1 mg/dL 8.9   Anion Gap Latest Ref Range: 3 - 14 mmol/L 4   Magnesium Latest Ref Range: 1.6 - 2.3 mg/dL 2.2   Phosphorus Latest Ref Range: 2.5 - 4.5 mg/dL 3.1   Albumin Latest Ref Range: 3.4 - 5.0 g/dL 3.3 (L)   Glucose Latest Ref Range: 70 - 99 mg/dL 96     EXAM: US RENAL COMPLETE.     HISTORY: Recurrent kidney stones.     COMPARISON: 8/4/2020     FINDINGS: The right kidney measures 11.8 cm  and the left kidney  measures 12.1 cm.      There is no hydronephrosis.  Normal cortical thickness and  echogenicity.  No focal scar or abnormal mass.      There are shadowing echogenic foci two on the right and three on the  left. 2 right-sided echogenic foci present in the mid kidney and  measures up to 3-4 mm. 3 left-sided echogenic foci, mid to inferior  kidney, are present ranging from 3 - 7 mm in size.       The bladder is partially distended.                                                                      IMPRESSION: Multiple bilateral nonobstructing renal calculi.  Largest  on the right measures up to 4 mm and on the left up to 7 mm.       30 minutes spent on the date of the encounter doing chart review, history and exam, documentation and further activities per the note    Assessment and Plan:      ICD-10-CM    1. Kidney stones  N20.0    2. Hypokalemia  E87.6 potassium chloride ER (MICRO-K) 10 MEQ CR capsule   3. Idiopathic hypercalciuria  R82.994        Mariana is a 21 year old young woman with a history of kidney stones due to hypercalciuria who was stone free from 2616-6903 while on HCTZ. This was stopped in May 2014 and a follow up ultrasound was negative for stones in Oct. 2014. Unfortunately, she redeveloped stones in August 2015, 24 hour urine calcium was again elevated at 300mg, and she was restarted on HCTZ. She passed a stone in 2017 and required lithotripsy in March 2021 for calcium phosphate/calcium oxalate stone. Her ultrasound today shows stones in both kidneys, the largest measuring 7mm. She should continue her hydrochlorothiazide. Calcium was normal (155mg/24 hours) on Litholink collection in Sept 2020 on her current dose. Creatinine is normal and she is not having any side effects of the hydrochlorothiazide except for hypokalemia.      Hypokalemia: Potassium is now normal after increasing potassium supplementation .       She was encouraged to keep up the good work with water intake.  Goal water intake is >2000ml/day and ideally 3L/day. I will defer repeat of Litholink collection to her adult nephrologist.        Patient Education: During this visit I discussed in detail the patient s symptoms, physical exam and evaluation results findings, tentative diagnosis as well as the treatment plan (Including but not limited to possible side effects and complications related to the disease, treatment modalities and intervention(s). Family expressed understanding and consent. Family was receptive and ready to learn; no apparent learning barriers were identified.    Follow up: No follow-ups on file. Follow up with Dr. Lee on 12/7/21 as previously scheduled. Please return sooner should Mariana become symptomatic.      Sincerely,    Kim Bernal MD   Pediatric Nephrology    CC:   Patient Care Team:  Doege, Rebecca A, MD as PCP - General (Pediatrics)  Kim Bernal MD as Assigned Pediatric Specialist Provider  Kim Bernal MD as Referring Physician (Pediatric Nephrology)  Anabella Lee MD as MD (Nephrology)  DOEGE, REBECCA A    Copy to patient  Crystal BOWLING [place]  [address]  [facility] [address]

## 2021-07-29 NOTE — PATIENT INSTRUCTIONS
[facility]  Pediatric Specialty Clinic [place]      Pediatric Call Center Scheduling and Nurse Questions:  [phone]  Adrianne Schroeder, RN Care Coordinator    After hours urgent matters that cannot wait until the next business day:  [phone].  Ask for the on-call pediatric doctor for the specialty you are calling for be paged.    For dermatology urgent matters that cannot wait until the next business day, is over a holiday and/or a weekend please call [phone] and ask for the Dermatology Resident On-Call to be paged.    Prescription Renewals:  Please call your pharmacy first.  Your pharmacy must fax requests to [phone].  Please allow 2-3 days for prescriptions to be authorized.    If your physician has ordered a CT or MRI, you may schedule this test by calling [facility] in [place] at [phone].    **If your child is having a sedated procedure, they will need a history and physical done at their Primary Care Provider within 30 days of the procedure.  If your child was seen by the ordering provider in our office within 30 days of the procedure, their visit summary will work for the H&P unless they inform you otherwise.  If you have any questions, please call the RN Care Coordinator.**

## 2021-09-26 ENCOUNTER — HEALTH MAINTENANCE LETTER (OUTPATIENT)
Age: 21
End: 2021-09-26

## 2021-10-05 ENCOUNTER — MEDICAL CORRESPONDENCE (OUTPATIENT)
Dept: HEALTH INFORMATION MANAGEMENT | Facility: CLINIC | Age: 21
End: 2021-10-05

## 2021-10-27 ENCOUNTER — APPOINTMENT (OUTPATIENT)
Dept: URBAN - METROPOLITAN AREA CLINIC 259 | Age: 21
Setting detail: DERMATOLOGY
End: 2021-10-27

## 2021-10-27 DIAGNOSIS — L21.8 OTHER SEBORRHEIC DERMATITIS: ICD-10-CM

## 2021-10-27 DIAGNOSIS — L57.8 OTHER SKIN CHANGES DUE TO CHRONIC EXPOSURE TO NONIONIZING RADIATION: ICD-10-CM

## 2021-10-27 DIAGNOSIS — L70.0 ACNE VULGARIS: ICD-10-CM

## 2021-10-27 PROCEDURE — OTHER COUNSELING: OTHER

## 2021-10-27 PROCEDURE — 99214 OFFICE O/P EST MOD 30 MIN: CPT

## 2021-10-27 PROCEDURE — OTHER MIPS QUALITY: OTHER

## 2021-10-27 PROCEDURE — OTHER PRESCRIPTION: OTHER

## 2021-10-27 RX ORDER — HYDROCORTISONE 25 MG/G
CREAM TOPICAL
Qty: 30 | Refills: 2 | Status: ERX | COMMUNITY
Start: 2021-10-27

## 2021-10-27 RX ORDER — CLINDAMYCIN PHOSPHATE AND BENZOYL PEROXIDE 1 %-5 %
KIT TOPICAL
Qty: 50 | Refills: 2 | Status: ERX | COMMUNITY
Start: 2021-10-27

## 2021-10-27 RX ORDER — KETOCONAZOLE 20 MG/G
CREAM TOPICAL BID
Qty: 30 | Refills: 2 | Status: ERX | COMMUNITY
Start: 2021-10-27

## 2021-10-27 ASSESSMENT — LOCATION DETAILED DESCRIPTION DERM
LOCATION DETAILED: LEFT MEDIAL MALAR CHEEK
LOCATION DETAILED: LEFT SUPERIOR MEDIAL BUCCAL CHEEK
LOCATION DETAILED: RIGHT INFERIOR MEDIAL FOREHEAD

## 2021-10-27 ASSESSMENT — LOCATION SIMPLE DESCRIPTION DERM
LOCATION SIMPLE: LEFT CHEEK
LOCATION SIMPLE: RIGHT FOREHEAD

## 2021-10-27 ASSESSMENT — LOCATION ZONE DERM: LOCATION ZONE: FACE

## 2021-10-27 NOTE — PROCEDURE: MIPS QUALITY
Quality 111:Pneumonia Vaccination Status For Older Adults: Pneumococcal Vaccination not Administered or Previously Received, Reason not Otherwise Specified
Quality 431: Preventive Care And Screening: Unhealthy Alcohol Use - Screening: Patient screened for unhealthy alcohol use using a single question and scores less than 2 times per year
Quality 226: Preventive Care And Screening: Tobacco Use: Screening And Cessation Intervention: Patient screened for tobacco use and is an ex/non-smoker
Quality 110: Preventive Care And Screening: Influenza Immunization: Influenza Immunization Ordered or Recommended, but not Administered due to system reason
Quality 130: Documentation Of Current Medications In The Medical Record: Current Medications Documented
Detail Level: Detailed

## 2021-10-27 NOTE — PROCEDURE: COUNSELING
Erythromycin Counseling:  I discussed with the patient the risks of erythromycin including but not limited to GI upset, allergic reaction, drug rash, diarrhea, increase in liver enzymes, and yeast infections.
Doxycycline Counseling:  Patient counseled regarding possible photosensitivity and increased risk for sunburn.  Patient instructed to avoid sunlight, if possible.  When exposed to sunlight, patients should wear protective clothing, sunglasses, and sunscreen.  The patient was instructed to call the office immediately if the following severe adverse effects occur:  hearing changes, easy bruising/bleeding, severe headache, or vision changes.  The patient verbalized understanding of the proper use and possible adverse effects of doxycycline.  All of the patient's questions and concerns were addressed.
Isotretinoin Counseling: Patient should get monthly blood tests, not donate blood, not drive at night if vision affected, not share medication, and not undergo elective surgery for 6 months after tx completed. Side effects reviewed, pt to contact office should one occur.
Use Enhanced Medication Counseling?: No
Tazorac Counseling:  Patient advised that medication is irritating and drying.  Patient may need to apply sparingly and wash off after an hour before eventually leaving it on overnight.  The patient verbalized understanding of the proper use and possible adverse effects of tazorac.  All of the patient's questions and concerns were addressed.
Dapsone Pregnancy And Lactation Text: This medication is Pregnancy Category C and is not considered safe during pregnancy or breast feeding.
Benzoyl Peroxide Pregnancy And Lactation Text: This medication is Pregnancy Category C. It is unknown if benzoyl peroxide is excreted in breast milk.
Bactrim Pregnancy And Lactation Text: This medication is Pregnancy Category D and is known to cause fetal risk.  It is also excreted in breast milk.
Bactrim Counseling:  I discussed with the patient the risks of sulfa antibiotics including but not limited to GI upset, allergic reaction, drug rash, diarrhea, dizziness, photosensitivity, and yeast infections.  Rarely, more serious reactions can occur including but not limited to aplastic anemia, agranulocytosis, methemoglobinemia, blood dyscrasias, liver or kidney failure, lung infiltrates or desquamative/blistering drug rashes.
High Dose Vitamin A Pregnancy And Lactation Text: High dose vitamin A therapy is contraindicated during pregnancy and breast feeding.
Birth Control Pills Pregnancy And Lactation Text: This medication should be avoided if pregnant and for the first 30 days post-partum.
High Dose Vitamin A Counseling: Side effects reviewed, pt to contact office should one occur.
Azithromycin Counseling:  I discussed with the patient the risks of azithromycin including but not limited to GI upset, allergic reaction, drug rash, diarrhea, and yeast infections.
Dapsone Counseling: I discussed with the patient the risks of dapsone including but not limited to hemolytic anemia, agranulocytosis, rashes, methemoglobinemia, kidney failure, peripheral neuropathy, headaches, GI upset, and liver toxicity.  Patients who start dapsone require monitoring including baseline LFTs and weekly CBCs for the first month, then every month thereafter.  The patient verbalized understanding of the proper use and possible adverse effects of dapsone.  All of the patient's questions and concerns were addressed.
Birth Control Pills Counseling: Birth Control Pill Counseling: I discussed with the patient the potential side effects of OCPs including but not limited to increased risk of stroke, heart attack, thrombophlebitis, deep venous thrombosis, hepatic adenomas, breast changes, GI upset, headaches, and depression.  The patient verbalized understanding of the proper use and possible adverse effects of OCPs. All of the patient's questions and concerns were addressed.
Benzoyl Peroxide Counseling: Patient counseled that medicine may cause skin irritation and bleach clothing.  In the event of skin irritation, the patient was advised to reduce the amount of the drug applied or use it less frequently.   The patient verbalized understanding of the proper use and possible adverse effects of benzoyl peroxide.  All of the patient's questions and concerns were addressed.
Topical Clindamycin Pregnancy And Lactation Text: This medication is Pregnancy Category B and is considered safe during pregnancy. It is unknown if it is excreted in breast milk.
Minocycline Counseling: Patient advised regarding possible photosensitivity and discoloration of the teeth, skin, lips, tongue and gums.  Patient instructed to avoid sunlight, if possible.  When exposed to sunlight, patients should wear protective clothing, sunglasses, and sunscreen.  The patient was instructed to call the office immediately if the following severe adverse effects occur:  hearing changes, easy bruising/bleeding, severe headache, or vision changes.  The patient verbalized understanding of the proper use and possible adverse effects of minocycline.  All of the patient's questions and concerns were addressed.
Sarecycline Pregnancy And Lactation Text: This medication is Pregnancy Category D and not consider safe during pregnancy. It is also excreted in breast milk.
Isotretinoin Pregnancy And Lactation Text: This medication is Pregnancy Category X and is considered extremely dangerous during pregnancy. It is unknown if it is excreted in breast milk.
Erythromycin Pregnancy And Lactation Text: This medication is Pregnancy Category B and is considered safe during pregnancy. It is also excreted in breast milk.
Tetracycline Counseling: Patient counseled regarding possible photosensitivity and increased risk for sunburn.  Patient instructed to avoid sunlight, if possible.  When exposed to sunlight, patients should wear protective clothing, sunglasses, and sunscreen.  The patient was instructed to call the office immediately if the following severe adverse effects occur:  hearing changes, easy bruising/bleeding, severe headache, or vision changes.  The patient verbalized understanding of the proper use and possible adverse effects of tetracycline.  All of the patient's questions and concerns were addressed. Patient understands to avoid pregnancy while on therapy due to potential birth defects.
Azithromycin Pregnancy And Lactation Text: This medication is considered safe during pregnancy and is also secreted in breast milk.
Detail Level: Generalized
Topical Clindamycin Counseling: Patient counseled that this medication may cause skin irritation or allergic reactions.  In the event of skin irritation, the patient was advised to reduce the amount of the drug applied or use it less frequently.   The patient verbalized understanding of the proper use and possible adverse effects of clindamycin.  All of the patient's questions and concerns were addressed.
Spironolactone Counseling: Patient advised regarding risks of diarrhea, abdominal pain, hyperkalemia, birth defects (for female patients), liver toxicity and renal toxicity. The patient may need blood work to monitor liver and kidney function and potassium levels while on therapy. The patient verbalized understanding of the proper use and possible adverse effects of spironolactone.  All of the patient's questions and concerns were addressed.
Topical Retinoid Pregnancy And Lactation Text: This medication is Pregnancy Category C. It is unknown if this medication is excreted in breast milk.
Tazorac Pregnancy And Lactation Text: This medication is not safe during pregnancy. It is unknown if this medication is excreted in breast milk.
Topical Sulfur Applications Counseling: Topical Sulfur Counseling: Patient counseled that this medication may cause skin irritation or allergic reactions.  In the event of skin irritation, the patient was advised to reduce the amount of the drug applied or use it less frequently.   The patient verbalized understanding of the proper use and possible adverse effects of topical sulfur application.  All of the patient's questions and concerns were addressed.
Topical Sulfur Applications Pregnancy And Lactation Text: This medication is Pregnancy Category C and has an unknown safety profile during pregnancy. It is unknown if this topical medication is excreted in breast milk.
Spironolactone Pregnancy And Lactation Text: This medication can cause feminization of the male fetus and should be avoided during pregnancy. The active metabolite is also found in breast milk.
Doxycycline Pregnancy And Lactation Text: This medication is Pregnancy Category D and not consider safe during pregnancy. It is also excreted in breast milk but is considered safe for shorter treatment courses.
Sarecycline Counseling: Patient advised regarding possible photosensitivity and discoloration of the teeth, skin, lips, tongue and gums.  Patient instructed to avoid sunlight, if possible.  When exposed to sunlight, patients should wear protective clothing, sunglasses, and sunscreen.  The patient was instructed to call the office immediately if the following severe adverse effects occur:  hearing changes, easy bruising/bleeding, severe headache, or vision changes.  The patient verbalized understanding of the proper use and possible adverse effects of sarecycline.  All of the patient's questions and concerns were addressed.
Topical Retinoid counseling:  Patient advised to apply a pea-sized amount only at bedtime and wait 30 minutes after washing their face before applying.  If too drying, patient may add a non-comedogenic moisturizer. The patient verbalized understanding of the proper use and possible adverse effects of retinoids.  All of the patient's questions and concerns were addressed.

## 2021-11-30 ENCOUNTER — LAB (OUTPATIENT)
Dept: LAB | Facility: CLINIC | Age: 21
End: 2021-11-30
Payer: COMMERCIAL

## 2021-11-30 DIAGNOSIS — N20.0 KIDNEY STONES: ICD-10-CM

## 2021-11-30 DIAGNOSIS — E87.6 HYPOKALEMIA: ICD-10-CM

## 2021-11-30 LAB
ALBUMIN SERPL-MCNC: 3.4 G/DL (ref 3.4–5)
ANION GAP SERPL CALCULATED.3IONS-SCNC: 4 MMOL/L (ref 3–14)
BUN SERPL-MCNC: 10 MG/DL (ref 7–30)
CALCIUM SERPL-MCNC: 9.9 MG/DL (ref 8.5–10.1)
CHLORIDE BLD-SCNC: 103 MMOL/L (ref 94–109)
CO2 SERPL-SCNC: 31 MMOL/L (ref 20–32)
CREAT SERPL-MCNC: 0.78 MG/DL (ref 0.52–1.04)
GFR SERPL CREATININE-BSD FRML MDRD: >90 ML/MIN/1.73M2
GLUCOSE BLD-MCNC: 91 MG/DL (ref 70–99)
MAGNESIUM SERPL-MCNC: 2 MG/DL (ref 1.6–2.3)
PHOSPHATE SERPL-MCNC: 3.1 MG/DL (ref 2.5–4.5)
POTASSIUM BLD-SCNC: 3.8 MMOL/L (ref 3.4–5.3)
SODIUM SERPL-SCNC: 138 MMOL/L (ref 133–144)

## 2021-11-30 PROCEDURE — 80069 RENAL FUNCTION PANEL: CPT

## 2021-11-30 PROCEDURE — 83735 ASSAY OF MAGNESIUM: CPT

## 2021-11-30 PROCEDURE — 36415 COLL VENOUS BLD VENIPUNCTURE: CPT

## 2021-12-01 ENCOUNTER — TRANSFERRED RECORDS (OUTPATIENT)
Dept: HEALTH INFORMATION MANAGEMENT | Facility: CLINIC | Age: 21
End: 2021-12-01
Payer: COMMERCIAL

## 2021-12-07 ENCOUNTER — VIRTUAL VISIT (OUTPATIENT)
Dept: NEPHROLOGY | Facility: CLINIC | Age: 21
End: 2021-12-07
Payer: COMMERCIAL

## 2021-12-07 DIAGNOSIS — R82.994 HYPERCALCIURIA: ICD-10-CM

## 2021-12-07 DIAGNOSIS — Z79.899 ENCOUNTER FOR MONITORING DIURETIC THERAPY: ICD-10-CM

## 2021-12-07 DIAGNOSIS — E87.6 HYPOKALEMIA: ICD-10-CM

## 2021-12-07 DIAGNOSIS — N20.0 RECURRENT KIDNEY STONES: Primary | ICD-10-CM

## 2021-12-07 DIAGNOSIS — Z51.81 ENCOUNTER FOR MONITORING DIURETIC THERAPY: ICD-10-CM

## 2021-12-07 PROCEDURE — 99204 OFFICE O/P NEW MOD 45 MIN: CPT | Mod: GT | Performed by: INTERNAL MEDICINE

## 2021-12-07 NOTE — PROGRESS NOTES
Mariana is a 21 year old who is being evaluated via a billable video visit.      How would you like to obtain your AVS? Mail a copy  If the video visit is dropped, the invitation should be resent by: Send to e-mail at: [email or web address]  Will anyone else be joining your video visit? No- Mother      Video Start Time: 4:03 PM  Video-Visit Details    Type of service:  Video Visit    Video End Time:4:40 pm    Originating Location (pt. Location): Home    Distant Location (provider location):  [facility] [place]     Platform used for Video Visit: Droplet

## 2021-12-07 NOTE — PROGRESS NOTES
[facility]  12/07/2021     Mariana Quintana MRN:[id number] YOB: 2000  Primary care provider: Doege, Rebecca A  Urologist - Dr. Kuldeep Oviedo  Requesting physician: Dr. Kim Bernal     ASSESSMENT AND RECOMMENDATIONS:   Mariana Quintana is a 21 year old female presenting for nephrolithiasis.  # Recurrent kidney stones: since age 8, stone type calcium oxalate and calcium phosphate  History of hypercalciuria, no hyperoxaluria  Normal magnesium levels  No proteinuria  - no clear genetic based syndrome but she is a very rapid stone former and needs close monitoring and follow up. Has formed large stones requiring hospitalizations and surgery.   Has high risk of forming more stones given her history.    #Hypercalciuria - on hydrochlorothiazide 25 mg bid - continue hydrochlorothiazide with periodic electrolyte panel and kidney function monitoring.  #Hypokalemia - on potassium chloride 50 meq daily  Potassium level drawn last week was normal.  # dx diuretic monitoring    No change in medications  Reviewed low salt diet (avoid fast food which she has occasionally, process foods)  Increase fruit/vegatable  More phytates (whole grains)    Repeat 24 hour chemistries June 2022  Repeat imaging KUB with Dr. Oviedo June 2022 - reviewed there was stool and gas obscuring right kidney, suggested possible laxative the day before, noting to eat for 6 hours prior to KUB to avoid production of gas.  Return in about 7 months (around 7/7/2022).       Anabella Lee MD  [facility]  Department of Medicine  Division of Renal Disease and Hypertension  Amcom  will [place] Web Console        REASON FOR CONSULT: nephrolithiasis    HISTORY OF PRESENT ILLNESS:  Mariana Quintana is a 21 year old with recurrent kidney stones since about 2008 previously seen     Hypercalciuria  Was on hydrochlorothiazide 25 mg bid [id number] and was off for a short time and now  back on again.  Had side effect of hypokalemia requiring a lot of potassium supplement - 50 meq daily (30 in am and 20 in pm).    Stone surgical history:  2021  Aug 2021 - ESWL Left    Last imagin2021 ultrasound IMPRESSION: Multiple bilateral nonobstructing renal calculi.  Largest  on the right measures up to 4 mm and on the left up to 7 mm.      KUB - [facility] 2021  Stone [place]: multiple stones bilaterally on ultrasound but KUB done last week shows no stones on left    Mariana Quintana's diet consists of 2-3 meals per day.    1st meal toast or cereal  2nd meal left overs, chicken wrap with grilled chicken/lettuce, frozen pot pie  3rd meal sometimes salad with dinner  Usually - meat (steak or hamburger) and starch  Snacks occasional apple  Loves pickles but is restricting sodium  Historically ate calcium in excess, now sticking to 2-3 servings per day.  approx one serving fruit/veggie per day.    Drinks about 160 ounces fluid per day    24 hour urine chem pending    Family history is positive for kidney stones in maternal grandfather, mother does not have kidney stones. No siblings    REVIEW OF SYSTEMS:  A 10 point review of systems was negative except as noted above.    Problem list  Patient Active Problem List   Diagnosis     Idiopathic hypercalciuria     Kidney stones     Hypokalemia     PSH  Past Surgical History:   Procedure Laterality Date     LAPAROSCOPIC APPENDECTOMY  2017     Patella femoral ligament repair  2016     PE TUBES  Age 1    chronic otitis media       Social  In college  Social History     Socioeconomic History     Marital status: Single     Spouse name: Not on file     Number of children: Not on file     Years of education: Not on file     Highest education level: Not on file   Occupational History     Not on file   Tobacco Use     Smoking status: Never Smoker     Smokeless tobacco: Never Used   Substance and Sexual Activity     Alcohol use: Not on file      Drug use: Not on file     Sexual activity: Not on file   Other Topics Concern     Not on file   Social History Narrative    Mariana graduated from high school and is planning to attend [employer] in the Fall to study sociology, anthropology, and art. 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      Social Determinants of Health     Financial Resource Strain: Not on file   Food Insecurity: Not on file   Transportation Needs: Not on file   Physical Activity: Not on file   Stress: Not on file   Social Connections: Not on file   Intimate Partner Violence: Not on file   Housing Stability: Not on file     Family history  Family History   Problem Relation Age of Onset     Genitourinary Problems Maternal Grandfather         Kidney stones developed in his teens     Kidney Disease Maternal Grandmother         Hypertensive kidney disease          MEDICATIONS:  Current Outpatient Medications   Medication Sig Dispense Refill     citalopram (CELEXA) 20 MG tablet Take 20 mg by mouth daily.       CRANBERRY PO Take 1 tablet by mouth daily       hydrochlorothiazide (HYDRODIURIL) 25 MG tablet Take 1 tablet (25 mg) by mouth 2 times daily 180 tablet 11     norgestimate-ethinyl estradiol (ESTARYLLA) 0.25-35 MG-MCG tablet Take 1 tablet by mouth daily       potassium chloride ER (MICRO-K) 10 MEQ CR capsule Take 30mEq (3 tabs) in the morning and 20mEq (2 tabs) at night. 450 capsule 1     VITAMIN D PO Take 1 tablet by mouth daily          ALLERGIES:    No Known Allergies      PHYSICAL EXAM:     GENERAL APPEARANCE: alert and no distress  RESP: normal work of breathing, no conversational dyspnea  NEURO: mentation intact and speech normal    LABS:   I reviewed:  Electrolytes/Renal -   Recent Labs   Lab Test 11/30/21  0807 07/28/21  0757 05/27/21  0950    141 134   POTASSIUM 3.8 4.2 2.7*   CHLORIDE 103 107 98   CO2 31 30 29   BUN 10 8 14   CR 0.78 0.80 0.77   GLC 91 96 99   CHIOMA 9.9 8.9 9.5   MAG 2.0 2.2 1.9   PHOS 3.1 3.1 2.7       CBC - No  lab results found.    LFTs -   Recent Labs   Lab Test 11/30/21  0807 07/28/21  0757 05/27/21  0950   ALBUMIN 3.4 3.3* 3.6       Coags - No lab results found.    Iron Panel - No lab results found.    Endocrine - No lab results found.    IMAGING:  reviewed    Anabella Lee MD

## 2021-12-07 NOTE — LETTER
12/7/2021       RE: Mariana Quintana  [address]  [place] [address]     Dear Colleague,    Thank you for referring your patient, Mariana Quintana, to the [facility]ROLOG[facility] [place] at [facility]. Please see a copy of my visit note below.    [facility] Nephrology [facility] Stone [facility]  12/07/2021     Mariana Quintana MRN:[id number] YOB: 2000  Primary care provider: Doege, Rebecca A  Urologist - Dr. Kuldeep Oviedo  Requesting physician: Dr. Kim Bernal     ASSESSMENT AND RECOMMENDATIONS:   Mariana Quintana is a 21 year old female presenting for nephrolithiasis.  # Recurrent kidney stones: since age 8, stone type calcium oxalate and calcium phosphate  History of hypercalciuria, no hyperoxaluria  Normal magnesium levels  No proteinuria  - no clear genetic based syndrome but she is a very rapid stone former and needs close monitoring and follow up. Has formed large stones requiring hospitalizations and surgery.   Has high risk of forming more stones given her history.    #Hypercalciuria - on hydrochlorothiazide 25 mg bid - continue hydrochlorothiazide with periodic electrolyte panel and kidney function monitoring.  #Hypokalemia - on potassium chloride 50 meq daily  Potassium level drawn last week was normal.  # dx diuretic monitoring    No change in medications  Reviewed low salt diet (avoid fast food which she has occasionally, process foods)  Increase fruit/vegatable  More phytates (whole grains)    Repeat 24 hour chemistries June 2022  Repeat imaging KUB with Dr. Oviedo June 2022 - reviewed there was stool and gas obscuring right kidney, suggested possible laxative the day before, noting to eat for 6 hours prior to KUB to avoid production of gas.  Return in about 7 months (around 7/7/2022).       Anabella Lee MD  [facility]  Department of Medicine  Division of Renal Disease and  Hypertension  Amcom  will  [employer] Web Console        REASON FOR CONSULT: nephrolithiasis    HISTORY OF PRESENT ILLNESS:  Mariana Quintana is a 21 year old with recurrent kidney stones since about  previously seen     Hypercalciuria  Was on hydrochlorothiazide 25 mg bid [id number] and was off for a short time and now back on again.  Had side effect of hypokalemia requiring a lot of potassium supplement - 50 meq daily (30 in am and 20 in pm).    Stone surgical history:  2021  Aug 2021 - ESWL Left    Last imagin2021 ultrasound IMPRESSION: Multiple bilateral nonobstructing renal calculi.  Largest  on the right measures up to 4 mm and on the left up to 7 mm.      KUB - [facility] 2021  Stone [place]: multiple stones bilaterally on ultrasound but KUB done last week shows no stones on left    Mariana Quintana's diet consists of 2-3 meals per day.    1st meal toast or cereal  2nd meal left overs, chicken wrap with grilled chicken/lettuce, frozen pot pie  3rd meal sometimes salad with dinner  Usually - meat (steak or hamburger) and starch  Snacks occasional apple  Loves pickles but is restricting sodium  Historically ate calcium in excess, now sticking to 2-3 servings per day.  approx one serving fruit/veggie per day.    Drinks about 160 ounces fluid per day    24 hour urine chem pending    Family history is positive for kidney stones in maternal grandfather, mother does not have kidney stones. No siblings    REVIEW OF SYSTEMS:  A 10 point review of systems was negative except as noted above.    Problem list  Patient Active Problem List   Diagnosis     Idiopathic hypercalciuria     Kidney stones     Hypokalemia     PSH  Past Surgical History:   Procedure Laterality Date     LAPAROSCOPIC APPENDECTOMY  2017     Patella femoral ligament repair  2016     PE TUBES  Age 1    chronic otitis media       Social  In college  Social History     Socioeconomic History     Marital status: Single      Spouse name: Not on file     Number of children: Not on file     Years of education: Not on file     Highest education level: Not on file   Occupational History     Not on file   Tobacco Use     Smoking status: Never Smoker     Smokeless tobacco: Never Used   Substance and Sexual Activity     Alcohol use: Not on file     Drug use: Not on file     Sexual activity: Not on file   Other Topics Concern     Not on file   Social History Narrative    Mariana graduated from high school and is planning to attend [employer] in the Fall to study sociology, anthropology, and art. 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      Social Determinants of Health     Financial Resource Strain: Not on file   Food Insecurity: Not on file   Transportation Needs: Not on file   Physical Activity: Not on file   Stress: Not on file   Social Connections: Not on file   Intimate Partner Violence: Not on file   Housing Stability: Not on file     Family history  Family History   Problem Relation Age of Onset     Genitourinary Problems Maternal Grandfather         Kidney stones developed in his teens     Kidney Disease Maternal Grandmother         Hypertensive kidney disease          MEDICATIONS:  Current Outpatient Medications   Medication Sig Dispense Refill     citalopram (CELEXA) 20 MG tablet Take 20 mg by mouth daily.       CRANBERRY PO Take 1 tablet by mouth daily       hydrochlorothiazide (HYDRODIURIL) 25 MG tablet Take 1 tablet (25 mg) by mouth 2 times daily 180 tablet 11     norgestimate-ethinyl estradiol (ESTARYLLA) 0.25-35 MG-MCG tablet Take 1 tablet by mouth daily       potassium chloride ER (MICRO-K) 10 MEQ CR capsule Take 30mEq (3 tabs) in the morning and 20mEq (2 tabs) at night. 450 capsule 1     VITAMIN D PO Take 1 tablet by mouth daily          ALLERGIES:    No Known Allergies      PHYSICAL EXAM:     GENERAL APPEARANCE: alert and no distress  RESP: normal work of breathing, no conversational dyspnea  NEURO: mentation intact and  speech normal    LABS:   I reviewed:  Electrolytes/Renal -   Recent Labs   Lab Test 11/30/21  0807 07/28/21  0757 05/27/21  0950    141 134   POTASSIUM 3.8 4.2 2.7*   CHLORIDE 103 107 98   CO2 31 30 29   BUN 10 8 14   CR 0.78 0.80 0.77   GLC 91 96 99   CHIOMA 9.9 8.9 9.5   MAG 2.0 2.2 1.9   PHOS 3.1 3.1 2.7       CBC - No lab results found.    LFTs -   Recent Labs   Lab Test 11/30/21  0807 07/28/21  0757 05/27/21  0950   ALBUMIN 3.4 3.3* 3.6       Coags - No lab results found.    Iron Panel - No lab results found.    Endocrine - No lab results found.    IMAGING:  reviewed    Anabella Lee MD

## 2021-12-07 NOTE — PATIENT INSTRUCTIONS
Dear Mariana Quintana    Keep up the good work with high fluid intake!    Today we discussed low salt diet, more plant based foods, vegetables    - low sodium diet (less than 2400 mg sodium per day- 500-700 mg per meal)    - Protein (meat) in moderation  - Eat more foods with phytates. Whole grains (such as whole wheat bread, oatmeal and barley), brown rice, vegetables and beans have the most phytates. Omega 3 fatty acids may also be helpful.    Higher fruit and vegetable intake preferred  Some good options include: (higher alkali fruits and vegetables)  -apples, apricots, oranges (the pith contains oxalate so be mindful of portions), peaches, pears, raisins, strawberries, carrots, cauliflower, eggplant, lettuce, potatoes, tomatoes, and zucchini    Before your xray with Dr. Oviedo, you may consider taking a laxative the day before and don't eat anything 6 hours before the xray to avoid possible gas obscuring the view.    Please get the following tests done:  John June 2022    Please set up appointment with:  Return in about 7 months (around 7/7/2022).     It was a pleasure meeting with you today. Thank you for allowing me and my team the privilege of caring for you today. Please let us know if there is anything else we can do for you.    Take care!  Anabella Lee MD  Department of Medicine  Division of Renal Diseases and Hypertension  [facility]    Email: [email or web address]  You may reach a nurse by calling the urology clinic at [phone]

## 2021-12-08 ENCOUNTER — TELEPHONE (OUTPATIENT)
Dept: MULTI SPECIALTY CLINIC | Facility: CLINIC | Age: 21
End: 2021-12-08
Payer: COMMERCIAL

## 2022-04-04 ENCOUNTER — TELEPHONE (OUTPATIENT)
Dept: UROLOGY | Facility: CLINIC | Age: 22
End: 2022-04-04
Payer: COMMERCIAL

## 2022-04-04 NOTE — TELEPHONE ENCOUNTER
[facility] Call Center    Phone Message    May a detailed message be left on voicemail: yes     Reason for Call: Order(s): Other:   Reason for requested: Repeat 24 hour chemistries June 2022 per Dr. Nowakfermadie  Date needed: Before scheduled appt on 08/17/22  Provider name: Elfering    Patient requests labwork done at [facility]      Action Taken: Nephrology    Travel Screening: Not Applicable

## 2022-04-06 NOTE — TELEPHONE ENCOUNTER
Attempted to call pt. Left detailed message to call clinic back at [phone].   To inform pt that litholink test will be mailed out to pt on 6/1/22.     Chio Peacock RN MSN

## 2022-04-07 NOTE — TELEPHONE ENCOUNTER
[facility] Call Center    Phone Message    May a detailed message be left on voicemail: yes     Reason for Call: Other: Pt called back to confirm receipt of message regarding the litholink being mailed out 6/1. Thank you     Action Taken: Message routed to:  [facility] Neph    Travel Screening: Not Applicable

## 2022-04-18 DIAGNOSIS — E87.6 HYPOKALEMIA: ICD-10-CM

## 2022-04-18 DIAGNOSIS — R82.994 HYPERCALCIURIA: ICD-10-CM

## 2022-04-18 NOTE — TELEPHONE ENCOUNTER
[facility] Call Center    Phone Message    May a detailed message be left on voicemail: yes     Reason for Call: Medication Refill Request    Has the patient contacted the pharmacy for the refill? Yes   Name of medication being requested: potassium chloride ER (MICRO-K) 10 MEQ CR capsule    hydrochlorothiazide (HYDRODIURIL) 25 MG tablet    Provider who prescribed the medication: Dr. Lee  Pharmacy: [facility]  Date medication is needed: asap      Other: Patient was calling to update Dr. Lee on the passing of a stone on 4/8 from the right side along with a medication refill. Please reach out to patient if needed. Thank you    Action Taken: Message routed to:  [facility] Neph    Travel Screening: Not Applicable

## 2022-04-18 NOTE — TELEPHONE ENCOUNTER
potassium chloride ER (MICRO-K) 10 MEQ CR capsule     Last Written Prescription Date:  7-  Last Fill Quantity: 450,   # refills: 1    Routing refill request to provider for review/approval because:  Not on protocol.  GAP IN THERAPY        hydrochlorothiazide (HYDRODIURIL) 25 MG tablet      Last Written Prescription Date:  1-8-2021  Last Fill Quantity: 180,   # refills: 11    Routing refill request to provider for review/approval because:  Not on protocol.      Last Office Visit : 12-7-2021  Future Office visit:  8-    Kathleen M Doege RN

## 2022-04-20 RX ORDER — POTASSIUM CHLORIDE 750 MG/1
CAPSULE, EXTENDED RELEASE ORAL
Qty: 450 CAPSULE | Refills: 1 | Status: SHIPPED | OUTPATIENT
Start: 2022-04-20 | End: 2023-08-18

## 2022-04-20 RX ORDER — HYDROCHLOROTHIAZIDE 25 MG/1
25 TABLET ORAL
Qty: 180 TABLET | Refills: 11 | Status: SHIPPED | OUTPATIENT
Start: 2022-04-20 | End: 2022-11-07

## 2022-05-08 ENCOUNTER — HEALTH MAINTENANCE LETTER (OUTPATIENT)
Age: 22
End: 2022-05-08

## 2022-07-08 ENCOUNTER — TELEPHONE (OUTPATIENT)
Dept: UROLOGY | Facility: CLINIC | Age: 22
End: 2022-07-08

## 2022-07-08 NOTE — TELEPHONE ENCOUNTER
Talked with Pt.  Gave her options for kidney stones.  She will look at options and call and schedule.

## 2022-07-11 ENCOUNTER — PRE VISIT (OUTPATIENT)
Dept: UROLOGY | Facility: CLINIC | Age: 22
End: 2022-07-11

## 2022-07-11 NOTE — TELEPHONE ENCOUNTER
Reason for visit: Consult     Dx/Hx/Sx: Kidney stones    Records/imaging/labs/orders: In EPIC    At Rooming: standard video    Action July 12, 2022 JTV 11:17 AM    Action Taken [facility] sent a request to [facility] for images to be pushed.      Action July 18, 2022 JTV 1:30 PM    Action Taken [facility] received and resolved images from [facility].

## 2022-07-14 DIAGNOSIS — N20.0 KIDNEY STONES: Primary | ICD-10-CM

## 2022-07-18 ENCOUNTER — ANCILLARY PROCEDURE (OUTPATIENT)
Dept: CT IMAGING | Facility: CLINIC | Age: 22
End: 2022-07-18
Attending: UROLOGY
Payer: COMMERCIAL

## 2022-07-18 DIAGNOSIS — N20.0 KIDNEY STONES: ICD-10-CM

## 2022-07-18 PROCEDURE — 74176 CT ABD & PELVIS W/O CONTRAST: CPT | Mod: GC | Performed by: RADIOLOGY

## 2022-07-25 ENCOUNTER — TRANSFERRED RECORDS (OUTPATIENT)
Dept: HEALTH INFORMATION MANAGEMENT | Facility: CLINIC | Age: 22
End: 2022-07-25

## 2022-07-26 ENCOUNTER — DOCUMENTATION ONLY (OUTPATIENT)
Dept: LAB | Facility: CLINIC | Age: 22
End: 2022-07-26

## 2022-07-26 DIAGNOSIS — N20.0 KIDNEY STONES: Primary | ICD-10-CM

## 2022-07-26 DIAGNOSIS — E83.50 CALCIUM METABOLISM DISORDER: ICD-10-CM

## 2022-07-27 ENCOUNTER — LAB (OUTPATIENT)
Dept: LAB | Facility: CLINIC | Age: 22
End: 2022-07-27
Payer: COMMERCIAL

## 2022-07-27 DIAGNOSIS — E83.50 CALCIUM METABOLISM DISORDER: ICD-10-CM

## 2022-07-27 DIAGNOSIS — N20.0 KIDNEY STONES: ICD-10-CM

## 2022-07-27 LAB
ALBUMIN SERPL-MCNC: 3.4 G/DL (ref 3.4–5)
ANION GAP SERPL CALCULATED.3IONS-SCNC: 5 MMOL/L (ref 3–14)
BUN SERPL-MCNC: 13 MG/DL (ref 7–30)
CALCIUM SERPL-MCNC: 9.2 MG/DL (ref 8.5–10.1)
CHLORIDE BLD-SCNC: 102 MMOL/L (ref 94–109)
CO2 SERPL-SCNC: 31 MMOL/L (ref 20–32)
CREAT SERPL-MCNC: 0.77 MG/DL (ref 0.52–1.04)
GFR SERPL CREATININE-BSD FRML MDRD: >90 ML/MIN/1.73M2
GLUCOSE BLD-MCNC: 107 MG/DL (ref 70–99)
Lab: NORMAL
PERFORMING LABORATORY: NORMAL
PHOSPHATE SERPL-MCNC: 2.2 MG/DL (ref 2.5–4.5)
POTASSIUM BLD-SCNC: 3.4 MMOL/L (ref 3.4–5.3)
SODIUM SERPL-SCNC: 138 MMOL/L (ref 133–144)
SPECIMEN STATUS: NORMAL
TEST NAME: NORMAL

## 2022-07-27 PROCEDURE — 82542 COL CHROMOTOGRAPHY QUAL/QUAN: CPT

## 2022-07-27 PROCEDURE — 80069 RENAL FUNCTION PANEL: CPT

## 2022-07-27 PROCEDURE — 82306 VITAMIN D 25 HYDROXY: CPT

## 2022-07-27 PROCEDURE — 36415 COLL VENOUS BLD VENIPUNCTURE: CPT

## 2022-08-02 ENCOUNTER — VIRTUAL VISIT (OUTPATIENT)
Dept: UROLOGY | Facility: CLINIC | Age: 22
End: 2022-08-02
Payer: COMMERCIAL

## 2022-08-02 DIAGNOSIS — N20.0 KIDNEY STONE: Primary | ICD-10-CM

## 2022-08-02 LAB
DEPRECATED CALCIDIOL+CALCIFEROL SERPL-MC: <66 UG/L (ref 20–75)
VITAMIN D2 SERPL-MCNC: <5 UG/L
VITAMIN D3 SERPL-MCNC: 61 UG/L

## 2022-08-02 PROCEDURE — 99214 OFFICE O/P EST MOD 30 MIN: CPT | Mod: 95 | Performed by: UROLOGY

## 2022-08-02 RX ORDER — KETOCONAZOLE 20 MG/G
CREAM TOPICAL
COMMUNITY
Start: 2021-10-27

## 2022-08-02 RX ORDER — MULTIPLE VITAMINS W/ MINERALS TAB 9MG-400MCG
1 TAB ORAL DAILY
COMMUNITY

## 2022-08-02 RX ORDER — POTASSIUM CHLORIDE 750 MG/1
20 TABLET, EXTENDED RELEASE ORAL
COMMUNITY
End: 2022-11-07

## 2022-08-02 NOTE — PROGRESS NOTES
Mariana is a 22 year old who is being evaluated via a billable video visit.           UROLOGY OUTPATIENT VISIT      Chief Complaint:   Kidney stones      Synopsis    Mariana Quintana is a very pleasant AGE: 22 year old year old person    Has seen Dr. Bernal and Rosa in the past  Stones since age 9  Takes hydrochlorothiazide BID and potassium  Last stone event was spontaneous passage 3 months ago, this was the only stone event this year  She is feeling well at the moment denies urinary symptoms or flank pain  Her last 24-hour urine was performed in December 2021  On personal review she appears to make adequate volume of urine, the only real concern is that she has a high calcium level with excess urinary sodium.  Her baseline urinary pH is also on the high side  We discussed her dietary strategies and she appears to be well-informed understanding the importance of a high fluid, low salt, low oxalate diet.    She has another 24-hour urine pending as well as some serum labs previously ordered by Dr. Zhang to look for causes of hypercalciuria.    Her last CT scan is personally reviewed by me and we discussed the findings together which showed punctate nephrolithiasis in each kidney.  There was no hydronephrosis    Her last serum labs show a mildly low serum potassium but normal renal function otherwise    Medical Comorbidities      Past Medical History:   Diagnosis Date     Idiopathic hypercalciuria     Started therapy with HCTZ in July 2009     Kidney stone 12/2017    Passed stone     Kidney stones Jan 2009    passed left sided stone on 1/28/13               Medications     Current Outpatient Medications   Medication     Cholecalciferol 125 MCG (5000 UT) TBDP     citalopram (CELEXA) 20 MG tablet     hydrochlorothiazide (HYDRODIURIL) 25 MG tablet     ketoconazole (NIZORAL) 2 % external cream     multivitamin w/minerals (THERA-VIT-M) tablet     norgestimate-ethinyl estradiol (ORTHO-CYCLEN) 0.25-35 MG-MCG tablet      potassium chloride ER (KLOR-CON M) 10 MEQ CR tablet     VITAMIN D PO     CRANBERRY PO     potassium chloride ER (MICRO-K) 10 MEQ CR capsule     No current facility-administered medications for this visit.         The following  distinct labs were reviewed    I personally reviewed all applicable laboratory data and went over findings with patient  Significant for:    CBC RESULTS:  No results for input(s): WBC, HGB, PLT in the last 85410 hours.     BMP RESULTS:  Recent Labs   Lab Test 07/27/22  0810 11/30/21  0807 07/28/21  0757 05/27/21  0950 04/05/21  1039 08/04/20  1346 08/20/19  1256 07/17/18  1428    138 141 134   < > 136 136 140   POTASSIUM 3.4 3.8 4.2 2.7*   < > 3.2* 3.7 3.7   CHLORIDE 102 103 107 98   < > 100 104 104   CO2 31 31 30 29   < > 31 24 29   ANIONGAP 5 4 4 7   < > 5 8 7   * 91 96 99  --  78 84 102*   BUN 13 10 8 14   < > 12 13 11   CR 0.77 0.78 0.80 0.77  --  0.79 0.74 0.79   GFRESTIMATED >90 >90 >90 >90  --  >90 >90 >90   GFRESTBLACK  --   --   --  >90  --  >90 >90 >90    < > = values in this interval not displayed.       CALCIUM RESULTS:  Recent Labs   Lab Test 07/27/22  0810 11/30/21  0807 07/28/21  0757 05/27/21  0950   CHIOMA 9.2 9.9 8.9 9.5       PTH RESULTS:  Recent Labs   Lab Test 04/13/21  1327   PTHI 27       UA RESULTS:   Recent Labs   Lab Test 08/04/20  1340 08/20/19  1256 07/17/18  1433   SG 1.015 1.017 1.003   URINEPH 8.0* 8.0* 7.0   NITRITE Negative Negative Negative   RBCU 1 1 <1   WBCU <1 <1 <1         Recent Imaging Report    I personally reviewed all applicable imaging and went over the below findings with patient.    Results for orders placed or performed in visit on 07/18/22   CT Abdomen pelvis w/o contrast    Narrative    EXAMINATION: CT ABDOMEN PELVIS W/O CONTRAST, 7/18/2022 5:01 PM    TECHNIQUE:  Helical CT images from the lung bases through the  symphysis pubis were obtained without contrast.  Coronal and sagittal  reformatted images were generated at a workstation  for further  assessment.    COMPARISON: Renal ultrasound 7/20/2021, CT abdomen and pelvis 3/2/2021    HISTORY: Kidney stones    FINDINGS:    Liver: Normal noncontrast appearance of the liver.  Gallbladder: Biliary sludge without cholelithiasis or evidence of  acute cholecystitis. No intra- or extra-hepatic biliary ductal  dilatation.  Spleen: Small splenule near the hilum.  Pancreas: Slight increased fullness of the pancreatic tail, stable. No  main pancreatic ductal dilation. No peripancreatic inflammatory  changes.  Adrenal glands: No discrete nodules.  Kidneys: No hydronephrosis. Bilateral punctate nonobstructive stones.  No suspicious mass.  Bladder / Pelvic organs: Bladder is within normal limits. No pelvic  mass.  Bowel: No evidence of obstruction. The appendix is surgically absent.  Lymph nodes: No suspicious lymphadenopathy.  Peritoneum / Retroperitoneum: No pneumoperitoneum. No organized fluid  collections. Small fat-containing umbilical hernia. Mild omental  nodularity within the left upper quadrant which has not significantly  changed as prior exam 03/02/2021 (for example as seen on series 3,  image 127, 117).  Abdominal vasculature: Normal caliber abdominal aorta.    Bones and soft tissues: No acute osseous abnormalities or suspicious  bony lesions.    Lower thorax: No focal consolidation or pleural effusion. Heart size  is within normal limits. No pericardial effusion. Small sliding type  hiatal hernia.      Impression    IMPRESSION:     1. Bilateral punctate nonobstructive nephrolithiasis.  2. Stable mild omental nodularity within the left upper quadrant,  which has not significantly changed since prior exam 03/02/2021.    I have personally reviewed the examination and initial interpretation  and I agree with the findings.    PRISCILLA CADET MD         SYSTEM ID:  [id number]              Assessment/Plan   22 year old year old person with recurrent calcium kidney stones  -We reviewed general strategies to  prevent stones including a high fluid low sodium diet  -We recommend updating imaging annually in the absence of symptoms  -She has a 24-hour urine pending we will keep an eye out for this and contact her with any findings  -Finally we discussed what to do in the event of a kidney stone attack we discussed that ibuprofen, Tylenol, Dramamine are good first-line treatment options with oxycodone and Flomax and standby    CC:  Doege, Rebecca A    >30 minutes spent exclusive to any listed procedure on the clinical encounter date doing chart review, history and exam, documentation and further activities as noted above             How would you like to obtain your AVS? MyChart  If the video visit is dropped, the invitation should be resent by: Send to e-mail at: [email or web address]  Will anyone else be joining your video visit? Yes: -. How would they like to receive their invitation? Text to cell phone: [phone]        Video-Visit Details    Video Start Time: 8:10    Type of service:  Video Visit    Video End Time:8:31    Originating Location (pt. Location): Home    Distant Location (provider location):  [facility] UROLOG[facility]     Platform used for Video Visit: [employer]

## 2022-08-02 NOTE — LETTER
8/2/2022       RE: Mariana Quintana  [address]  [place] [address]     Dear Colleague,    Thank you for referring your patient, Mariana Quintana, to the [facility]OLOG[facility] [place] at [facility]. Please see a copy of my visit note below.    Mariana is a 22 year old who is being evaluated via a billable video visit.           UROLOGY OUTPATIENT VISIT      Chief Complaint:   Kidney stones      Synopsis    Mariana Quintana is a very pleasant AGE: 22 year old year old person    Has seen Dr. Bernal and Rosa in the past  Stones since age 9  Takes hydrochlorothiazide BID and potassium  Last stone event was spontaneous passage 3 months ago, this was the only stone event this year  She is feeling well at the moment denies urinary symptoms or flank pain  Her last 24-hour urine was performed in December 2021  On personal review she appears to make adequate volume of urine, the only real concern is that she has a high calcium level with excess urinary sodium.  Her baseline urinary pH is also on the high side  We discussed her dietary strategies and she appears to be well-informed understanding the importance of a high fluid, low salt, low oxalate diet.    She has another 24-hour urine pending as well as some serum labs previously ordered by Dr. Zhang to look for causes of hypercalciuria.    Her last CT scan is personally reviewed by me and we discussed the findings together which showed punctate nephrolithiasis in each kidney.  There was no hydronephrosis    Her last serum labs show a mildly low serum potassium but normal renal function otherwise    Medical Comorbidities      Past Medical History:   Diagnosis Date     Idiopathic hypercalciuria     Started therapy with HCTZ in July 2009     Kidney stone 12/2017    Passed stone     Kidney stones Jan 2009    passed left sided stone on 1/28/13               Medications     Current Outpatient Medications    Medication     Cholecalciferol 125 MCG (5000 UT) TBDP     citalopram (CELEXA) 20 MG tablet     hydrochlorothiazide (HYDRODIURIL) 25 MG tablet     ketoconazole (NIZORAL) 2 % external cream     multivitamin w/minerals (THERA-VIT-M) tablet     norgestimate-ethinyl estradiol (ORTHO-CYCLEN) 0.25-35 MG-MCG tablet     potassium chloride ER (KLOR-CON M) 10 MEQ CR tablet     VITAMIN D PO     CRANBERRY PO     potassium chloride ER (MICRO-K) 10 MEQ CR capsule     No current facility-administered medications for this visit.         The following  distinct labs were reviewed    I personally reviewed all applicable laboratory data and went over findings with patient  Significant for:    CBC RESULTS:  No results for input(s): WBC, HGB, PLT in the last 17401 hours.     BMP RESULTS:  Recent Labs   Lab Test 07/27/22  0810 11/30/21  0807 07/28/21  0757 05/27/21  0950 04/05/21  1039 08/04/20  1346 08/20/19  1256 07/17/18  1428    138 141 134   < > 136 136 140   POTASSIUM 3.4 3.8 4.2 2.7*   < > 3.2* 3.7 3.7   CHLORIDE 102 103 107 98   < > 100 104 104   CO2 31 31 30 29   < > 31 24 29   ANIONGAP 5 4 4 7   < > 5 8 7   * 91 96 99  --  78 84 102*   BUN 13 10 8 14   < > 12 13 11   CR 0.77 0.78 0.80 0.77  --  0.79 0.74 0.79   GFRESTIMATED >90 >90 >90 >90  --  >90 >90 >90   GFRESTBLACK  --   --   --  >90  --  >90 >90 >90    < > = values in this interval not displayed.       CALCIUM RESULTS:  Recent Labs   Lab Test 07/27/22  0810 11/30/21  0807 07/28/21  0757 05/27/21  0950   CHIOMA 9.2 9.9 8.9 9.5       PTH RESULTS:  Recent Labs   Lab Test 04/13/21  1327   PTHI 27       UA RESULTS:   Recent Labs   Lab Test 08/04/20  1340 08/20/19  1256 07/17/18  1433   SG 1.015 1.017 1.003   URINEPH 8.0* 8.0* 7.0   NITRITE Negative Negative Negative   RBCU 1 1 <1   WBCU <1 <1 <1         Recent Imaging Report    I personally reviewed all applicable imaging and went over the below findings with patient.    Results for orders placed or performed in  visit on 07/18/22   CT Abdomen pelvis w/o contrast    Narrative    EXAMINATION: CT ABDOMEN PELVIS W/O CONTRAST, 7/18/2022 5:01 PM    TECHNIQUE:  Helical CT images from the lung bases through the  symphysis pubis were obtained without contrast.  Coronal and sagittal  reformatted images were generated at a workstation for further  assessment.    COMPARISON: Renal ultrasound 7/20/2021, CT abdomen and pelvis 3/2/2021    HISTORY: Kidney stones    FINDINGS:    Liver: Normal noncontrast appearance of the liver.  Gallbladder: Biliary sludge without cholelithiasis or evidence of  acute cholecystitis. No intra- or extra-hepatic biliary ductal  dilatation.  Spleen: Small splenule near the hilum.  Pancreas: Slight increased fullness of the pancreatic tail, stable. No  main pancreatic ductal dilation. No peripancreatic inflammatory  changes.  Adrenal glands: No discrete nodules.  Kidneys: No hydronephrosis. Bilateral punctate nonobstructive stones.  No suspicious mass.  Bladder / Pelvic organs: Bladder is within normal limits. No pelvic  mass.  Bowel: No evidence of obstruction. The appendix is surgically absent.  Lymph nodes: No suspicious lymphadenopathy.  Peritoneum / Retroperitoneum: No pneumoperitoneum. No organized fluid  collections. Small fat-containing umbilical hernia. Mild omental  nodularity within the left upper quadrant which has not significantly  changed as prior exam 03/02/2021 (for example as seen on series 3,  image 127, 117).  Abdominal vasculature: Normal caliber abdominal aorta.    Bones and soft tissues: No acute osseous abnormalities or suspicious  bony lesions.    Lower thorax: No focal consolidation or pleural effusion. Heart size  is within normal limits. No pericardial effusion. Small sliding type  hiatal hernia.      Impression    IMPRESSION:     1. Bilateral punctate nonobstructive nephrolithiasis.  2. Stable mild omental nodularity within the left upper quadrant,  which has not significantly  changed since prior exam 03/02/2021.    I have personally reviewed the examination and initial interpretation  and I agree with the findings.    PRISCILLA CADET MD         SYSTEM ID:  [id number]              Assessment/Plan   22 year old year old person with recurrent calcium kidney stones  -We reviewed general strategies to prevent stones including a high fluid low sodium diet  -We recommend updating imaging annually in the absence of symptoms  -She has a 24-hour urine pending we will keep an eye out for this and contact her with any findings  -Finally we discussed what to do in the event of a kidney stone attack we discussed that ibuprofen, Tylenol, Dramamine are good first-line treatment options with oxycodone and Flomax and standby    CC:  Doege, Rebecca A    >30 minutes spent exclusive to any listed procedure on the clinical encounter date doing chart review, history and exam, documentation and further activities as noted above      How would you like to obtain your AVS? MyChart  If the video visit is dropped, the invitation should be resent by: Send to e-mail at: [email or web address]  Will anyone else be joining your video visit? Yes: -. How would they like to receive their invitation? Text to cell phone: [phone]        Video-Visit Details    Video Start Time: 8:10    Type of service:  Video Visit    Video End Time:8:31    Originating Location (pt. Location): Home    Distant Location (provider location):  [facility] UROLOGY CLINIC [place]     Platform used for Video Visit: Eliecer Gamez MD

## 2022-08-04 LAB — MAYO MISC RESULT: NORMAL

## 2023-01-14 ENCOUNTER — HEALTH MAINTENANCE LETTER (OUTPATIENT)
Age: 23
End: 2023-01-14

## 2023-05-18 DIAGNOSIS — R82.994 HYPERCALCIURIA: ICD-10-CM

## 2023-05-19 RX ORDER — HYDROCHLOROTHIAZIDE 25 MG/1
TABLET ORAL
Qty: 180 TABLET | Refills: 3 | Status: SHIPPED | OUTPATIENT
Start: 2023-05-19 | End: 2023-06-21

## 2023-06-02 ENCOUNTER — HEALTH MAINTENANCE LETTER (OUTPATIENT)
Age: 23
End: 2023-06-02

## 2023-06-13 ENCOUNTER — TELEPHONE (OUTPATIENT)
Dept: UROLOGY | Facility: CLINIC | Age: 23
End: 2023-06-13
Payer: COMMERCIAL

## 2023-06-13 NOTE — TELEPHONE ENCOUNTER
[facility] Call Center    Phone Message    May a detailed message be left on voicemail: yes     Reason for Call: Pt would like to [facility] prescriptions [facility] to [facility].  Please call pt if needed.   Thank you    Action Taken: Message routed to:  [facility] Uro    Travel Screening: Not Applicable

## 2023-06-19 DIAGNOSIS — R82.994 HYPERCALCIURIA: ICD-10-CM

## 2023-06-19 NOTE — TELEPHONE ENCOUNTER
[facility] Call Center    Phone Message    May a detailed message be left on voicemail: yes     Reason for Call: Medication Refill Request    Has the patient contacted the pharmacy for the refill? Yes   Name of medication being requested: hydrochlorothiazide (HYDRODIURIL) 25 MG tablet and potassium chloride ER (KLOR-CON M) 10 MEQ CR tablet  Provider who prescribed the medication: Dr. Lee  Pharmacy: [facility] in [place] on [address]  Date medication is needed: asap         Action Taken: Message routed to:  [facility] neph    Travel Screening: Not Applicable

## 2023-06-20 NOTE — TELEPHONE ENCOUNTER
potassium chloride ER (KLOR-CON M) 10 MEQ CR tablet   And     hydrochlorothiazide (HYDRODIURIL) 25 MG tablet    Nephrology Pt  Last seen Dec 2021  Pt needing new orders sent to updated pharmacy.    Also would somebody like an updated office visit for this Pt.        Pratima Cantrell RN  Central Triage Red Flags/Med Refills

## 2023-06-21 DIAGNOSIS — N20.0 KIDNEY STONES: ICD-10-CM

## 2023-06-21 DIAGNOSIS — R82.994 HYPERCALCIURIA: ICD-10-CM

## 2023-06-21 DIAGNOSIS — E83.50 CALCIUM METABOLISM DISORDER: Primary | ICD-10-CM

## 2023-06-21 NOTE — TELEPHONE ENCOUNTER
hydrochlorothiazide (HYDRODIURIL) 25 MG tablet  Last Office Visit :  8/2/2023  Future Office visit:  None  Routing refill request to provider for review/approval because:  Needing new order signed by Dr. Chace Gamez MD.   Not seeing Nephrology anymore and requesting new orders by Dr. Vera FRANKLIN.     Please send new orders with update Providers signature for Pt care.     Pharmacy has been updated.   Thank you       potassium chloride ER (KLOR-CON M) 10 MEQ CR tablet  Last Office Visit :  8/2/2023  Future Office visit:  None  Routing refill request to provider for review/approval because:  Needing new order signed by Dr. Chace Gamez MD.   Not seeing Nephrology anymore and requesting new orders by Dr. Vera FRANKLIN.     Please send new orders with update Providers signature for Pt care.     Pharmacy has been updated.   Thank you    Pratima Cantrell RN  Central Triage Red Flags/Med Refills

## 2023-06-21 NOTE — TELEPHONE ENCOUNTER
[facility] Call Center    Phone Message    May a detailed message be left on voicemail: yes     Reason for Call: Patients mother calling about some confusion in regards to who is prescribing her meds along with her pharmacy. States patient is in desperate need of refills for hydrochlorothiazide and potassium chloride ER and she thought Dr. Gamez would take over those refills since she  No longer is seeing Dr. Lee. Dr. Jefferson office is requesting to her have a visit with them in order to refill. She also had a switch in pharmacies that was recently noted. Her new pharmacy is [facility].     Please reach out to patient directly when prescription has been send to new pharmacy. Thank you    Action Taken: Message routed to:  [facility] URO    Travel Screening: Not Applicable

## 2023-06-22 ENCOUNTER — TELEPHONE (OUTPATIENT)
Dept: UROLOGY | Facility: CLINIC | Age: 23
End: 2023-06-22
Payer: COMMERCIAL

## 2023-06-22 RX ORDER — POTASSIUM CHLORIDE 750 MG/1
TABLET, EXTENDED RELEASE ORAL
Qty: 450 TABLET | Refills: 3 | Status: SHIPPED | OUTPATIENT
Start: 2023-06-22 | End: 2023-08-18

## 2023-06-22 RX ORDER — HYDROCHLOROTHIAZIDE 25 MG/1
25 TABLET ORAL 2 TIMES DAILY
Qty: 180 TABLET | Refills: 3 | Status: SHIPPED | OUTPATIENT
Start: 2023-06-22 | End: 2023-08-18

## 2023-06-22 NOTE — TELEPHONE ENCOUNTER
[facility] Call Center    Phone Message    May a detailed message be left on voicemail: yes     Reason for Call: Mom is wondering if patient needs labs for her next appt with Dr. Gamez.  Per mom, patient needs to have calcium and potassium labs done.  If the patient does need labs done, please put labs in system.    Action Taken: Message routed to:  [facility] Urology    Travel Screening: Not Applicable

## 2023-06-22 NOTE — TELEPHONE ENCOUNTER
Pt's mother calling back. Wondering on an update. neph will not refill. Waiting on Dr Rousseau response. Pt at new job and insurance no longer covers [facility]. Pt has 3 months left but meds needs to go to [facility] instead.

## 2023-06-22 NOTE — TELEPHONE ENCOUNTER
Per mom, Nicole, please reach out to her once meds are sent to pharmacy so they can go pick it up.

## 2023-06-22 NOTE — TELEPHONE ENCOUNTER
RNCC called to patient, refills sent per request to new pharmacy.    Per pt, she wishes to follow someone in urology for stone prevention due to trouble getting an appointment with nephrology in timely fashion and reschedules.     RNCC will have her follow Kayla Dia instead, and pt is agreeable to plan.    Per Dr. Gamez note from 08/2022 pt should have annual imaging.     Ct ordered and BMP ordered. Visit scheduled with Kayla Dia for her annual visit in Aug    AVI Goodwin  Care Coordinator  [phone]

## 2023-06-22 NOTE — TELEPHONE ENCOUNTER
[facility] spoke with pt and sent mychart with details of annual follow up. Please see mychart or refill note for full detail.     Resolved    AVI Goodwin  Care Coordinator  [phone]

## 2023-07-20 RX ORDER — POTASSIUM CHLORIDE 750 MG/1
TABLET, EXTENDED RELEASE ORAL
Qty: 450 TABLET | OUTPATIENT
Start: 2023-07-20

## 2023-07-20 RX ORDER — HYDROCHLOROTHIAZIDE 25 MG/1
25 TABLET ORAL 2 TIMES DAILY
Qty: 180 TABLET | OUTPATIENT
Start: 2023-07-20

## 2023-07-31 ENCOUNTER — ANCILLARY PROCEDURE (OUTPATIENT)
Dept: CT IMAGING | Facility: CLINIC | Age: 23
End: 2023-07-31
Attending: UROLOGY
Payer: COMMERCIAL

## 2023-07-31 ENCOUNTER — LAB (OUTPATIENT)
Dept: LAB | Facility: CLINIC | Age: 23
End: 2023-07-31
Payer: COMMERCIAL

## 2023-07-31 DIAGNOSIS — R82.994 HYPERCALCIURIA: ICD-10-CM

## 2023-07-31 LAB
ANION GAP SERPL CALCULATED.3IONS-SCNC: 12 MMOL/L (ref 7–15)
BUN SERPL-MCNC: 13.6 MG/DL (ref 6–20)
CALCIUM SERPL-MCNC: 9.7 MG/DL (ref 8.6–10)
CHLORIDE SERPL-SCNC: 99 MMOL/L (ref 98–107)
CREAT SERPL-MCNC: 0.85 MG/DL (ref 0.51–0.95)
DEPRECATED HCO3 PLAS-SCNC: 25 MMOL/L (ref 22–29)
GFR SERPL CREATININE-BSD FRML MDRD: >90 ML/MIN/1.73M2
GLUCOSE SERPL-MCNC: 94 MG/DL (ref 70–99)
POTASSIUM SERPL-SCNC: 3.9 MMOL/L (ref 3.4–5.3)
SODIUM SERPL-SCNC: 136 MMOL/L (ref 136–145)

## 2023-07-31 PROCEDURE — 80048 BASIC METABOLIC PNL TOTAL CA: CPT

## 2023-07-31 PROCEDURE — 36415 COLL VENOUS BLD VENIPUNCTURE: CPT

## 2023-07-31 PROCEDURE — 74176 CT ABD & PELVIS W/O CONTRAST: CPT | Performed by: RADIOLOGY

## 2023-08-02 ENCOUNTER — MEDICAL CORRESPONDENCE (OUTPATIENT)
Dept: HEALTH INFORMATION MANAGEMENT | Facility: CLINIC | Age: 23
End: 2023-08-02
Payer: COMMERCIAL

## 2023-08-09 ENCOUNTER — PRE VISIT (OUTPATIENT)
Dept: UROLOGY | Facility: CLINIC | Age: 23
End: 2023-08-09
Payer: COMMERCIAL

## 2023-08-09 NOTE — TELEPHONE ENCOUNTER
Reason for visit: 1 year follow up      Dx/Hx/Sx: kidney stone    Records/imaging/labs/orders: CT in epic     At Rooming: video visit

## 2023-08-18 ENCOUNTER — VIRTUAL VISIT (OUTPATIENT)
Dept: UROLOGY | Facility: CLINIC | Age: 23
End: 2023-08-18
Payer: COMMERCIAL

## 2023-08-18 DIAGNOSIS — N20.0 KIDNEY STONE: Primary | ICD-10-CM

## 2023-08-18 DIAGNOSIS — R82.994 HYPERCALCIURIA: ICD-10-CM

## 2023-08-18 PROCEDURE — 99213 OFFICE O/P EST LOW 20 MIN: CPT | Mod: VID | Performed by: NURSE PRACTITIONER

## 2023-08-18 RX ORDER — HYDROCHLOROTHIAZIDE 25 MG/1
25 TABLET ORAL 2 TIMES DAILY
Qty: 180 TABLET | Refills: 3 | Status: SHIPPED | OUTPATIENT
Start: 2023-08-18

## 2023-08-18 RX ORDER — POTASSIUM CHLORIDE 750 MG/1
TABLET, EXTENDED RELEASE ORAL
Qty: 450 TABLET | Refills: 3 | Status: SHIPPED | OUTPATIENT
Start: 2023-08-18

## 2023-08-18 NOTE — LETTER
8/18/2023       RE: Mariana Quintana  [address]  [facility] [id number]     Dear Colleague,    Thank you for referring your patient, Mariana Quintana, to the [facility] [place] at [facility]. Please see a copy of my visit note below.    Virtual Visit Details    Type of service:  Video Visit   Originating Location (pt. Location): Home  Distant Location (provider location):  Off-site  Platform used for Video Visit: [employer]    Video start time: 10:55 AM  Video end time: 11:03 AM    CC: Kidney stones    Assessment/Plan:  23 year old female with longstanding kidney stone issues dating back to age 9, managed with high-dose hydrochlorothiazide and potassium chloride. Stones unchanged on recent CT. -Continue meds at current doses. Refills sent.   -Follow up with me in one year with a BMP, renal ultrasound and 24-hour urine collection completed beforehand.     Kayla Dia, CNP  Department of Urology      Subjective:   23 year old female with a history of kidney stones since age 9. She takes hydrochlorothiazide 25 BID and potassium chloride 30 mEq in the morning and 20 mEq in the evening, for a total daily dose of 50 mEq. Was last seen by Dr. Gamez one year ago. CT reviewed at that time showed punctate stones in each kidney. Has also previously seen Dr. Lee.    She last completed Litholink in July, 2022, which showed high urine calcium (360 mg) in the setting of high dietary sodium.     Recent CT from a few weeks ago showed stable appearance of her bilateral stones. Recent BMP WNL- notably, her potassium normal at 3.9. This is usually low.     Has been doing fine since her last visit, with no stone episodes to report. Continues to tolerate the current doses of her meds fine.     Objective:     Exam:   Patient is a 23 year old female   No vital signs obtained due to virtual visit.  General Appearance: Well groomed,  hygenic  Respiratory: No cough, no respiratory distress or labored breathing  Musculoskeletal: Grossly normal with no gross deficits  Skin: No discoloration or apparent rashes  Neurologic: No tremors  Psychiatric: Alert and oriented  Further examination is deferred due to the nature of our visit.

## 2023-08-18 NOTE — PROGRESS NOTES
Virtual Visit Details    Type of service:  Video Visit   Originating Location (pt. Location): Home  Distant Location (provider location):  Off-site  Platform used for Video Visit: [employer]    Video start time: 10:55 AM  Video end time: 11:03 AM    CC: Kidney stones    Assessment/Plan:  23 year old female with longstanding kidney stone issues dating back to age 9, managed with high-dose hydrochlorothiazide and potassium chloride. Stones unchanged on recent CT. -Continue meds at current doses. Refills sent.   -Follow up with me in one year with a BMP, renal ultrasound and 24-hour urine collection completed beforehand.     Kayla Dia CNP  Department of Urology      Subjective:   23 year old female with a history of kidney stones since age 9. She takes hydrochlorothiazide 25 BID and potassium chloride 30 mEq in the morning and 20 mEq in the evening, for a total daily dose of 50 mEq. Was last seen by Dr. Gamez one year ago. CT reviewed at that time showed punctate stones in each kidney. Has also previously seen Dr. Lee.    She last completed Litholink in July, 2022, which showed high urine calcium (360 mg) in the setting of high dietary sodium.     Recent CT from a few weeks ago showed stable appearance of her bilateral stones. Recent BMP WNL- notably, her potassium normal at 3.9. This is usually low.     Has been doing fine since her last visit, with no stone episodes to report. Continues to tolerate the current doses of her meds fine.     Objective:     Exam:   Patient is a 23 year old female   No vital signs obtained due to virtual visit.  General Appearance: Well groomed, hygenic  Respiratory: No cough, no respiratory distress or labored breathing  Musculoskeletal: Grossly normal with no gross deficits  Skin: No discoloration or apparent rashes  Neurologic: No tremors  Psychiatric: Alert and oriented  Further examination is deferred due to the nature of our visit.

## 2023-08-18 NOTE — NURSING NOTE
Is the patient currently in the state of [place]? YES    Visit mode:VIDEO    If the visit is dropped, the patient can be reconnected by: TELEPHONE VISIT: Phone number:   Telephone Information:   Mobile [phone]       Will anyone else be joining the visit? NO  (If patient encounters technical issues they should call [phone]    How would you like to obtain your AVS? MyChart    Are changes needed to the allergy or medication list? No    Reason for visit: Video Visit (Return stone prevention )    Marisol DANIEL

## 2023-08-18 NOTE — PATIENT INSTRUCTIONS
UROLOGY CLINIC VISIT PATIENT INSTRUCTIONS    -Continue your medications at their current doses. Refills of both sent to your [facility] pharmacy.   -Follow up with me in one year with a metabolic panel (blood draw), renal ultrasound and 24-hour urine collection study completed beforehand.     If you have any issues, questions or concerns in the meantime, do not hesitate to contact us at [phone] or via [employer].     Kayla Dia CNP  Department of Urology

## 2024-02-01 ENCOUNTER — LAB REQUISITION (OUTPATIENT)
Dept: LAB | Facility: CLINIC | Age: 24
End: 2024-02-01

## 2024-02-01 DIAGNOSIS — Z13.220 ENCOUNTER FOR SCREENING FOR LIPOID DISORDERS: ICD-10-CM

## 2024-02-01 DIAGNOSIS — Z13.1 ENCOUNTER FOR SCREENING FOR DIABETES MELLITUS: ICD-10-CM

## 2024-02-01 DIAGNOSIS — Z13.29 ENCOUNTER FOR SCREENING FOR OTHER SUSPECTED ENDOCRINE DISORDER: ICD-10-CM

## 2024-02-01 DIAGNOSIS — Z01.419 ENCOUNTER FOR GYNECOLOGICAL EXAMINATION (GENERAL) (ROUTINE) WITHOUT ABNORMAL FINDINGS: ICD-10-CM

## 2024-02-01 LAB
CHOLEST SERPL-MCNC: 237 MG/DL
FASTING STATUS PATIENT QL REPORTED: YES
HBA1C MFR BLD: 5.2 %
HDLC SERPL-MCNC: 49 MG/DL
LDLC SERPL CALC-MCNC: 163 MG/DL
NONHDLC SERPL-MCNC: 188 MG/DL
TRIGL SERPL-MCNC: 125 MG/DL
TSH SERPL DL<=0.005 MIU/L-ACNC: 2.22 UIU/ML (ref 0.3–4.2)

## 2024-02-01 PROCEDURE — 84443 ASSAY THYROID STIM HORMONE: CPT | Performed by: OBSTETRICS & GYNECOLOGY

## 2024-02-01 PROCEDURE — G0145 SCR C/V CYTO,THINLAYER,RESCR: HCPCS | Performed by: OBSTETRICS & GYNECOLOGY

## 2024-02-01 PROCEDURE — 80061 LIPID PANEL: CPT | Performed by: OBSTETRICS & GYNECOLOGY

## 2024-02-01 PROCEDURE — 83036 HEMOGLOBIN GLYCOSYLATED A1C: CPT | Performed by: OBSTETRICS & GYNECOLOGY

## 2024-02-01 PROCEDURE — G0124 SCREEN C/V THIN LAYER BY MD: HCPCS | Performed by: PATHOLOGY

## 2024-02-06 LAB
BKR LAB AP GYN ADEQUACY: ABNORMAL
BKR LAB AP GYN INTERPRETATION: ABNORMAL
BKR LAB AP HPV REFLEX: ABNORMAL
BKR LAB AP LMP: ABNORMAL
BKR LAB AP PREVIOUS ABNL DX: ABNORMAL
BKR LAB AP PREVIOUS ABNORMAL: ABNORMAL
PATH REPORT.COMMENTS IMP SPEC: ABNORMAL
PATH REPORT.COMMENTS IMP SPEC: ABNORMAL
PATH REPORT.RELEVANT HX SPEC: ABNORMAL

## 2024-02-19 ENCOUNTER — TELEPHONE (OUTPATIENT)
Dept: UROLOGY | Facility: CLINIC | Age: 24
End: 2024-02-19
Payer: COMMERCIAL

## 2024-02-19 NOTE — TELEPHONE ENCOUNTER
Left Voicemail (1st Attempt) for the patient to call back and schedule the following:    Appointment type: Return  Provider: Kayla Dia  Return date: August 2024  Specialty phone number: [phone]  Additional appointment(s) needed: Renal Ultrasound and BMP lab prior  Additonal Notes: Yearly follow up with BMP lab, GOLD, and annalisa

## 2024-02-20 ENCOUNTER — LAB REQUISITION (OUTPATIENT)
Dept: LAB | Facility: CLINIC | Age: 24
End: 2024-02-20

## 2024-02-20 DIAGNOSIS — R87.612 LOW GRADE SQUAMOUS INTRAEPITHELIAL LESION ON CYTOLOGIC SMEAR OF CERVIX (LGSIL): ICD-10-CM

## 2024-02-20 PROCEDURE — 88305 TISSUE EXAM BY PATHOLOGIST: CPT | Performed by: PATHOLOGY

## 2024-02-21 ENCOUNTER — TELEPHONE (OUTPATIENT)
Dept: UROLOGY | Facility: CLINIC | Age: 24
End: 2024-02-21
Payer: COMMERCIAL

## 2024-02-21 NOTE — TELEPHONE ENCOUNTER
Spoke with pt to schedule yearly follow up, but she needs to wait a couple of weeks to verify her schedule prior to making appointment. Is aware of needing lab and G[facility] prior, and advised Kayla Dia is currently booked until June.

## 2024-02-22 LAB
PATH REPORT.COMMENTS IMP SPEC: NORMAL
PATH REPORT.COMMENTS IMP SPEC: NORMAL
PATH REPORT.FINAL DX SPEC: NORMAL
PATH REPORT.GROSS SPEC: NORMAL
PATH REPORT.MICROSCOPIC SPEC OTHER STN: NORMAL
PATH REPORT.RELEVANT HX SPEC: NORMAL
PHOTO IMAGE: NORMAL

## 2024-06-03 ENCOUNTER — TELEPHONE (OUTPATIENT)
Dept: UROLOGY | Facility: CLINIC | Age: 24
End: 2024-06-03
Payer: COMMERCIAL

## 2024-06-03 NOTE — TELEPHONE ENCOUNTER
Left Voicemail (1st Attempt) for the patient to call back and schedule the following:    Appointment type: Return   Provider: Kayla Dia   Return date: September 2024  Specialty phone number: [phone]  Additional appointment(s) needed: N/A  Additonal Notes: scheduled for a follow up with with kayla for August/sept - per staff message

## 2024-06-05 NOTE — TELEPHONE ENCOUNTER
Left Voicemail (2nd Attempt) for the patient to call back and schedule the following:    Appointment type: Return   Provider: Kayla Dia   Return date: September 2024  Specialty phone number: [phone]  Additional appointment(s) needed: N/A  Additonal Notes: scheduled for a follow up with with kayla for August/sept - per staff message

## 2024-06-30 ENCOUNTER — HEALTH MAINTENANCE LETTER (OUTPATIENT)
Age: 24
End: 2024-06-30

## 2024-09-25 ENCOUNTER — ANCILLARY ORDERS (OUTPATIENT)
Dept: CT IMAGING | Facility: CLINIC | Age: 24
End: 2024-09-25

## 2024-09-25 DIAGNOSIS — E87.6 HYPOKALEMIA: Primary | ICD-10-CM

## 2024-09-25 DIAGNOSIS — N20.0 KIDNEY STONE: ICD-10-CM

## 2024-09-25 DIAGNOSIS — R82.994 IDIOPATHIC HYPERCALCIURIA: ICD-10-CM

## 2024-09-26 ENCOUNTER — TRANSCRIBE ORDERS (OUTPATIENT)
Dept: LAB | Facility: CLINIC | Age: 24
End: 2024-09-26
Payer: COMMERCIAL

## 2024-10-01 ENCOUNTER — LAB (OUTPATIENT)
Dept: LAB | Facility: CLINIC | Age: 24
End: 2024-10-01
Payer: COMMERCIAL

## 2024-10-01 DIAGNOSIS — N20.0 RENAL STONE: ICD-10-CM

## 2024-10-01 DIAGNOSIS — E87.6 HYPOKALEMIA: Primary | ICD-10-CM

## 2024-10-01 DIAGNOSIS — R82.994 IDIOPATHIC HYPERCALCIURIA: ICD-10-CM

## 2024-10-01 LAB
CALCIUM SERPL-MCNC: 9.5 MG/DL (ref 8.8–10.4)
CREAT SERPL-MCNC: 0.82 MG/DL (ref 0.51–0.95)
EGFRCR SERPLBLD CKD-EPI 2021: >90 ML/MIN/1.73M2

## 2024-10-01 PROCEDURE — 82565 ASSAY OF CREATININE: CPT

## 2024-10-01 PROCEDURE — 36415 COLL VENOUS BLD VENIPUNCTURE: CPT

## 2024-10-01 PROCEDURE — 82310 ASSAY OF CALCIUM: CPT

## 2024-10-08 ENCOUNTER — ANCILLARY PROCEDURE (OUTPATIENT)
Dept: CT IMAGING | Facility: CLINIC | Age: 24
End: 2024-10-08
Attending: INTERNAL MEDICINE
Payer: COMMERCIAL

## 2024-10-08 DIAGNOSIS — N20.0 KIDNEY STONE: ICD-10-CM

## 2024-10-08 DIAGNOSIS — R82.994 IDIOPATHIC HYPERCALCIURIA: ICD-10-CM

## 2024-10-08 DIAGNOSIS — E87.6 HYPOKALEMIA: ICD-10-CM

## 2024-10-08 PROCEDURE — 74176 CT ABD & PELVIS W/O CONTRAST: CPT | Performed by: RADIOLOGY

## 2025-02-13 ENCOUNTER — TRANSFERRED RECORDS (OUTPATIENT)
Dept: HEALTH INFORMATION MANAGEMENT | Facility: CLINIC | Age: 25
End: 2025-02-13

## 2025-03-08 ENCOUNTER — HEALTH MAINTENANCE LETTER (OUTPATIENT)
Age: 25
End: 2025-03-08